# Patient Record
Sex: MALE | Race: WHITE | NOT HISPANIC OR LATINO | Employment: OTHER | URBAN - METROPOLITAN AREA
[De-identification: names, ages, dates, MRNs, and addresses within clinical notes are randomized per-mention and may not be internally consistent; named-entity substitution may affect disease eponyms.]

---

## 2017-08-16 DIAGNOSIS — E78.2 MIXED HYPERLIPIDEMIA: ICD-10-CM

## 2017-08-16 RX ORDER — ATORVASTATIN CALCIUM 10 MG/1
10 TABLET, FILM COATED ORAL EVERY EVENING
Qty: 30 TAB | Refills: 0 | Status: SHIPPED | OUTPATIENT
Start: 2017-08-16 | End: 2017-08-17 | Stop reason: SDUPTHER

## 2017-08-17 ENCOUNTER — OFFICE VISIT (OUTPATIENT)
Dept: MEDICAL GROUP | Facility: MEDICAL CENTER | Age: 57
End: 2017-08-17
Payer: MEDICARE

## 2017-08-17 VITALS
OXYGEN SATURATION: 96 % | DIASTOLIC BLOOD PRESSURE: 72 MMHG | BODY MASS INDEX: 27.43 KG/M2 | HEART RATE: 88 BPM | HEIGHT: 73 IN | WEIGHT: 207 LBS | TEMPERATURE: 97.2 F | SYSTOLIC BLOOD PRESSURE: 130 MMHG | RESPIRATION RATE: 16 BRPM

## 2017-08-17 DIAGNOSIS — Z12.5 SCREENING FOR PROSTATE CANCER: ICD-10-CM

## 2017-08-17 DIAGNOSIS — Z00.00 MEDICARE ANNUAL WELLNESS VISIT, SUBSEQUENT: ICD-10-CM

## 2017-08-17 DIAGNOSIS — R73.01 IMPAIRED FASTING GLUCOSE: ICD-10-CM

## 2017-08-17 DIAGNOSIS — E78.5 DYSLIPIDEMIA: ICD-10-CM

## 2017-08-17 DIAGNOSIS — S06.9X5S TRAUMATIC BRAIN INJURY WITH PROLONGED (MORE THAN 24 HOURS) LOSS OF CONSCIOUSNESS WITH RETURN TO PRE-EXISTING CONSCIOUS LEVEL, SEQUELA (HCC): ICD-10-CM

## 2017-08-17 PROBLEM — S06.9X5A TRAUMATIC BRAIN INJURY WITH PROLONGED (MORE THAN 24 HOURS) LOSS OF CONSCIOUSNESS WITH RETURN TO PRE-EXISTING CONSCIOUS LEVEL (HCC): Status: ACTIVE | Noted: 2017-08-17

## 2017-08-17 PROCEDURE — 99213 OFFICE O/P EST LOW 20 MIN: CPT | Mod: 25 | Performed by: NURSE PRACTITIONER

## 2017-08-17 PROCEDURE — G0439 PPPS, SUBSEQ VISIT: HCPCS | Mod: 25 | Performed by: NURSE PRACTITIONER

## 2017-08-17 RX ORDER — ATORVASTATIN CALCIUM 10 MG/1
10 TABLET, FILM COATED ORAL EVERY EVENING
Qty: 90 TAB | Refills: 1 | Status: SHIPPED | OUTPATIENT
Start: 2017-08-17 | End: 2018-03-15 | Stop reason: SDUPTHER

## 2017-08-17 ASSESSMENT — PATIENT HEALTH QUESTIONNAIRE - PHQ9: CLINICAL INTERPRETATION OF PHQ2 SCORE: 0

## 2017-08-17 NOTE — PROGRESS NOTES
CC: Patient is here today for annual Medicare wellness visit as well as lab work and to discuss his medication.    HPI:   Mac presents today with the following.    1. Medicare annual wellness visit, subsequent  Screening performed below.    2. Dyslipidemia  Patient's only prescription medicine has been Lipitor 10 mg which was prescribed by his previous PCP. His last blood work was done a year ago and showed an LDL of 75 and HDL of 33 on his medication. He denies myalgias but wonders if he should continue the medicine and why he should do so. He is also taking an aspirin daily.    3. Impaired fasting glucose  Patient's previous lab work showed mild elevations in blood sugar and he has not had a hemoglobin A1c. He does not complain of polyuria or polydipsia.    4. Screening for prostate cancer  Patient due for yearly screening.    5. 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 He does not seem to be worsening but it does frustrate patient that he has trouble remembering. He has seen neurology in the past and there is not much more that can be done except to try to keep his brain challenged which she is doing.      Depression Screening    Little interest or pleasure in doing things?  0 - not at all  Feeling down, depressed , or hopeless? 0 - not at all  Trouble falling or staying asleep, or sleeping too much?     Feeling tired or having little energy?     Poor appetite or overeating?     Feeling bad about yourself - or that you are a failure or have let yourself or your family down?    Trouble concentrating on things, such as reading the newspaper or watching television?    Moving or speaking so slowly that other people could have noticed.  Or the opposite - being so fidgety or restless that you have been  moving around a lot more than usual?     Thoughts that you would be better off dead, or of hurting yourself?     Patient Health Questionnaire Score:      If depressive symptoms identified deferred to follow up visit unless specifically addressed in assessment and plan.    Interpretation of PHQ-9 Total Score   Score Severity   1-4 No Depression   5-9 Mild Depression   10-14 Moderate Depression   15-19 Moderately Severe Depression   20-27 Severe Depression      Screening for Cognitive Impairment    Three Minute Recall (apple, watch, edwardo)  3/3    Draw clock face with all 12 numbers set to the hand to show 10 minutes past 11 o'clock  1    Cognitive concerns identified deferred for follow up unless specifically addressed in assessment and plan.    Fall Risk Assessment    Has the patient had two or more falls in the last year or any fall with injury in the last year?  No    Safety Assessment    Throw rugs on floor.  Yes  Handrails on all stairs.  Yes  Good lighting in all hallways.  Yes  Difficulty hearing.  No  Patient counseled about all safety risks that were identified.    Functional Assessment ADLs    Are there any barriers preventing you from cooking for yourself or meeting nutritional needs?  No.    Are there any barriers preventing you from driving safely or obtaining transportation?  No.    Are there any barriers preventing you from using a telephone or calling for help?  No.    Are there any barriers preventing you from shopping?  No.    Are there any barriers preventing you from taking care of your own finances?  No.    Are there any barriers preventing you from managing your medications?  No.    Are currently engaging any exercise or physical activity?  Yes.       Health Maintenance Summary                Annual Wellness Visit Overdue 5/12/2017      Done 5/11/2016      Patient has more history with this topic...    IMM DTaP/Tdap/Td Vaccine Postponed 8/15/2018 Originally 1/6/1978. Patient Refused    IMM  "INFLUENZA Next Due 9/1/2017     COLONOSCOPY Next Due 11/5/2025      Patient Declined 11/5/2015 Does not want one     Patient has more history with this topic...          Patient Care Team:  HUMA De. 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 90 Tab 1   • aspirin EC (ECOTRIN) 81 MG Tablet Delayed Response Take 81 mg by mouth every day.     • Multiple Vitamins-Minerals (CENTRUM SILVER ADULT 50+ PO) Take  by mouth.     • folic acid (FOLVITE) 1 MG Tab Take 1 mg by mouth every day.       No current facility-administered medications for this visit.         Allergies as of 08/17/2017   • (No Known Allergies)        ROS: As per HPI.    /72 mmHg  Pulse 88  Temp(Src) 36.2 °C (97.2 °F)  Resp 16  Ht 1.854 m (6' 1\")  Wt 93.895 kg (207 lb)  BMI 27.32 kg/m2  SpO2 96%    Physical Exam:  Gen:         Alert and oriented, No apparent distress.  Neck:        No Lymphadenopathy or Bruits.  Lungs:     Clear to auscultation bilaterally  CV:          Regular rate and rhythm. No murmurs, rubs or gallops.  Abd:         Soft non tender, non distended. Normal active bowel sounds.  No  Hepatosplenomegaly, No pulsatile masses.                   Ext:          No clubbing, cyanosis, edema.  Neuro:      Patient is pleasant and talkative but he does have some problems with short-term memory.    Assessment and Plan.   57 y.o. male with the following issues.    1. Medicare annual wellness visit, subsequent  Annual wellness topics discussed, review of chronic medical problems completed    - Annual Wellness Visit - Includes PPPS Subsequent ()    2. " Dyslipidemia  I reviewed with patient the pros and cons of Lipitor which does appear to be working for him. He agrees that he wishes to continue on the medicine and I reminded him he needs to have lab work yearly and follow up yearly for evaluation.  - atorvastatin (LIPITOR) 10 MG Tab; Take 1 Tab by mouth every evening.  Dispense: 90 Tab; Refill: 1  - LIPID PROFILE; Future    3. Impaired fasting glucose  Last blood sugar was 102 and previous blood sugars have been in the low 100 range fasting so I will have him do a hemoglobin A1c.  - COMP METABOLIC PANEL; Future  - HEMOGLOBIN A1C; Future    4. Screening for prostate cancer    - PROSTATE SPECIFIC AG SCREENING; Future    5. 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 He continues to try to stay active both mentally and physically.

## 2017-08-17 NOTE — MR AVS SNAPSHOT
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, sequela (CMS-[facility])   [7339263]         Vital Signs     Blood Pressure Pulse Temperature Respirations Height Weight    130/72 mmHg 88 36.2 °C (97.2 °F) 16 1.854 m (6' 1\") 93.895 kg (207 lb)    Body Mass Index Oxygen Saturation Smoking Status             27.32 kg/m2 96% Never Smoker          Basic Information     Date Of Birth Sex Race Ethnicity Preferred Language    1960 Male White Non- English      Problem List              ICD-10-CM Priority Class Noted - Resolved    Dyslipidemia E78.5   2016 - Present    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      Below and/or attached are the medications your provider expects you to take. Review all of your home medications and newly ordered medications with your provider " and/or pharmacist. Follow medication instructions as directed by your provider and/or pharmacist. Please keep your medication list with you and share with your provider. Update the information when medications are discontinued, doses are changed, or new medications (including over-the-counter products) are added; and carry medication information at all times in the event of emergency situations     Allergies:  No Known Allergies          Medications  Valid as of: August 17, 2017 -  3:28 PM    Generic Name Brand Name Tablet Size Instructions for use    Aspirin (Tablet Delayed Response) ECOTRIN 81 MG Take 81 mg by mouth every day.        Atorvastatin Calcium (Tab) LIPITOR 10 MG Take 1 Tab by mouth every evening.        Folic Acid (Tab) FOLVITE 1 MG Take 1 mg by mouth every day.        Multiple Vitamins-Minerals   Take  by mouth.        .                 Medicines prescribed today were sent to:     [facility] [address] [place] [address] (S), [place] - [address]    [facility] [place] ([place]) [place] [address]    Phone: [phone] Fax: [phone]    Open 24 Hours?: No      Medication refill instructions:       If your prescription bottle indicates you have medication refills left, it is not necessary to call your provider’s office. Please contact your pharmacy and they will refill your medication.    If your prescription bottle indicates you do not have any refills left, you may request refills at any time through one of the following ways: The online [employer] system (except Urgent Care), by calling your provider’s office, or by asking your pharmacy to contact your provider’s office with a refill request. Medication refills are processed only during regular business hours and may not be available until the next business day. Your provider may request additional information or to have a follow-up visit with you prior to refilling your medication.   *Please Note: Medication refills are assigned a new Rx number when refilled  electronically. Your pharmacy may indicate that no refills were authorized even though a new prescription for the same medication is available at the pharmacy. Please request the medicine by name with the pharmacy before contacting your provider for a refill.        Your To Do List     Future Labs/Procedures Complete By Expires    COMP METABOLIC PANEL  As directed 8/18/2018    HEMOGLOBIN A1C  As directed 8/18/2018    LIPID PROFILE  As directed 8/18/2018    PROSTATE SPECIFIC AG SCREENING  As directed 8/18/2018         MyChart Access Code: Activation code not generated  Current Dataium Status: Active

## 2017-08-22 ENCOUNTER — HOSPITAL ENCOUNTER (OUTPATIENT)
Dept: LAB | Facility: MEDICAL CENTER | Age: 57
End: 2017-08-22
Attending: NURSE PRACTITIONER
Payer: MEDICARE

## 2017-08-22 DIAGNOSIS — E78.5 DYSLIPIDEMIA: ICD-10-CM

## 2017-08-22 DIAGNOSIS — R73.01 IMPAIRED FASTING GLUCOSE: ICD-10-CM

## 2017-08-22 DIAGNOSIS — Z12.5 SCREENING FOR PROSTATE CANCER: ICD-10-CM

## 2017-08-22 LAB
ALBUMIN SERPL BCP-MCNC: 4.4 G/DL (ref 3.2–4.9)
ALBUMIN/GLOB SERPL: 1.3 G/DL
ALP SERPL-CCNC: 47 U/L (ref 30–99)
ALT SERPL-CCNC: 25 U/L (ref 2–50)
ANION GAP SERPL CALC-SCNC: 8 MMOL/L (ref 0–11.9)
AST SERPL-CCNC: 21 U/L (ref 12–45)
BILIRUB SERPL-MCNC: 0.6 MG/DL (ref 0.1–1.5)
BUN SERPL-MCNC: 17 MG/DL (ref 8–22)
CALCIUM SERPL-MCNC: 9.4 MG/DL (ref 8.5–10.5)
CHLORIDE SERPL-SCNC: 103 MMOL/L (ref 96–112)
CHOLEST SERPL-MCNC: 148 MG/DL (ref 100–199)
CO2 SERPL-SCNC: 27 MMOL/L (ref 20–33)
CREAT SERPL-MCNC: 0.83 MG/DL (ref 0.5–1.4)
EST. AVERAGE GLUCOSE BLD GHB EST-MCNC: 128 MG/DL
GFR SERPL CREATININE-BSD FRML MDRD: >60 ML/MIN/1.73 M 2
GLOBULIN SER CALC-MCNC: 3.3 G/DL (ref 1.9–3.5)
GLUCOSE SERPL-MCNC: 92 MG/DL (ref 65–99)
HBA1C MFR BLD: 6.1 % (ref 0–5.6)
HDLC SERPL-MCNC: 35 MG/DL
LDLC SERPL CALC-MCNC: 87 MG/DL
POTASSIUM SERPL-SCNC: 4.3 MMOL/L (ref 3.6–5.5)
PROT SERPL-MCNC: 7.7 G/DL (ref 6–8.2)
PSA SERPL-MCNC: 0.44 NG/ML (ref 0–4)
SODIUM SERPL-SCNC: 138 MMOL/L (ref 135–145)
TRIGL SERPL-MCNC: 129 MG/DL (ref 0–149)

## 2017-08-22 PROCEDURE — 83036 HEMOGLOBIN GLYCOSYLATED A1C: CPT | Mod: GA

## 2017-08-22 PROCEDURE — 80061 LIPID PANEL: CPT

## 2017-08-22 PROCEDURE — 84153 ASSAY OF PSA TOTAL: CPT | Mod: GA

## 2017-08-22 PROCEDURE — 36415 COLL VENOUS BLD VENIPUNCTURE: CPT

## 2017-08-22 PROCEDURE — 80053 COMPREHEN METABOLIC PANEL: CPT

## 2017-11-10 ENCOUNTER — TELEPHONE (OUTPATIENT)
Dept: MEDICAL GROUP | Facility: MEDICAL CENTER | Age: 57
End: 2017-11-10

## 2017-11-10 NOTE — TELEPHONE ENCOUNTER
No labs needed but he does need a visit if he wishes me to fill out his disability form in the next week or so.

## 2017-11-10 NOTE — TELEPHONE ENCOUNTER
1. Caller Name: katya                      Call Back Number: [phone] (home)       2. Message: patient would like to get his labs ordered for his next appt.     3. Patient approves office to leave a detailed voicemail/MyChart message: N\A

## 2017-11-15 ENCOUNTER — OFFICE VISIT (OUTPATIENT)
Dept: MEDICAL GROUP | Facility: MEDICAL CENTER | Age: 57
End: 2017-11-15
Payer: MEDICARE

## 2017-11-15 VITALS
HEART RATE: 78 BPM | HEIGHT: 73 IN | WEIGHT: 206 LBS | OXYGEN SATURATION: 93 % | BODY MASS INDEX: 27.3 KG/M2 | DIASTOLIC BLOOD PRESSURE: 74 MMHG | SYSTOLIC BLOOD PRESSURE: 130 MMHG | RESPIRATION RATE: 16 BRPM

## 2017-11-15 DIAGNOSIS — E78.5 DYSLIPIDEMIA: ICD-10-CM

## 2017-11-15 DIAGNOSIS — S06.9X5S TRAUMATIC BRAIN INJURY WITH PROLONGED (MORE THAN 24 HOURS) LOSS OF CONSCIOUSNESS WITH RETURN TO PRE-EXISTING CONSCIOUS LEVEL, SEQUELA (HCC): ICD-10-CM

## 2017-11-15 DIAGNOSIS — R73.01 IMPAIRED FASTING GLUCOSE: ICD-10-CM

## 2017-11-15 PROCEDURE — 99213 OFFICE O/P EST LOW 20 MIN: CPT | Performed by: NURSE PRACTITIONER

## 2017-11-15 ASSESSMENT — ENCOUNTER SYMPTOMS: MEMORY LOSS: 1

## 2017-11-15 NOTE — PROGRESS NOTES
Subjective:      Mac Yoder is a 57 y.o. male who presents with Other (fill ou disability paperwork)            HPI Mac Yoder Is here today accompanied by his girlfriend for paperwork for disability as well as follow-up on lab results.      1. 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 He is a prior patient of  who would fill out his paperwork yearly from review of chart. Patient's main problem is short-term memory. He will often forget what he did just a half hour prior to being asked. He has difficulty making decisions which his girlfriend helps him with.    2. Dyslipidemia  Patient is currently on low-dose Lipitor and his last cholesterol levels were good except for slightly low HDL.    3. Impaired fasting glucose  It was noted on his initial visit with myself that his blood sugars were running slightly high and therefore a hemoglobin A1c was done and the results came back at 6.1. He denies diabetes symptoms.  Social History   Substance Use Topics   • Smoking status: Never Smoker   • Smokeless tobacco: Never Used   • Alcohol use 0.6 oz/week     1 Cans of beer per week     Current Outpatient Prescriptions   Medication Sig Dispense Refill   • atorvastatin (LIPITOR) 10 MG Tab Take 1 Tab by mouth every evening. 90 Tab 1   • aspirin EC (ECOTRIN) 81 MG Tablet Delayed Response Take 81 mg by mouth every day.     • Multiple Vitamins-Minerals (CENTRUM SILVER ADULT 50+ PO) Take  by mouth.     • folic acid (FOLVITE) 1 MG Tab Take 1 mg by mouth every day.       No current facility-administered medications for this visit.      Past Medical History:   Diagnosis Date   • Hyperlipidemia      Family History   Problem Relation Age of Onset   • Other Mother    • Other Father      Cholesterol       Review of Systems  "  Psychiatric/Behavioral: Positive for memory loss.   All other systems reviewed and are negative.         Objective:     /74   Pulse 78   Resp 16   Ht 1.854 m (6' 1\")   Wt 93.4 kg (206 lb)   SpO2 93%   BMI 27.18 kg/m²      Physical Exam   Constitutional: He is oriented to person, place, and time. He appears well-developed and well-nourished. No distress.   HENT:   Head: Normocephalic and atraumatic.   Right Ear: External ear normal.   Left Ear: External ear normal.   Nose: Nose normal.   Mouth/Throat: Oropharynx is clear and moist.   Eyes: Conjunctivae are normal. Right eye exhibits no discharge. Left eye exhibits no discharge.   Neck: Normal range of motion. Neck supple. No tracheal deviation present. No thyromegaly present.   Cardiovascular: Normal rate, regular rhythm and normal heart sounds.    No murmur heard.  Pulmonary/Chest: Effort normal and breath sounds normal. No respiratory distress. He has no wheezes. He has no rales.   Lymphadenopathy:     He has no cervical adenopathy.   Neurological: He is alert and oriented to person, place, and time. Coordination normal.   Patient is pleasant, alert and talkative. He has difficulty with giving me the day of the week and today's date. He has difficulty with questions related to his events of the day. He is orientated to current president and birth date.   Skin: Skin is warm and dry. No rash noted. He is not diaphoretic. No erythema.   Psychiatric: He has a normal mood and affect. His behavior is normal. Judgment and thought content normal.   Nursing note and vitals reviewed.              Assessment/Plan:     1. 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    2. Dyslipidemia  Patient reminded to take his statin and get lab work done yearly. I recommended more exercise to " improve his HDL.    3. Impaired fasting glucose  I reviewed with patient that he is prediabetic and he needs to watch his carbohydrates. I would like him to do lab work in February and then follow-up to be sure he does not need to start on metformin.  - COMP METABOLIC PANEL; Future  - HEMOGLOBIN A1C; Future

## 2017-12-14 ENCOUNTER — PATIENT OUTREACH (OUTPATIENT)
Dept: HEALTH INFORMATION MANAGEMENT | Facility: OTHER | Age: 57
End: 2017-12-14

## 2017-12-14 NOTE — PROGRESS NOTES
1. Attempt # Final    2. HealthConnect Verified: no    3. Verify PCP: yes    4. Care Team Updated:       •   DME Company (gait device, O2, CPAP, etc.): NO       •   Other Specialists (eye doctor, derm, GYN, cardiology, endo, etc): YES// Pt has more specialist that she sees in the regular basis, but doesn't remember their names, will bring info to her next appt.    5.  Reviewed/Updated the following with patient:       •   Communication Preference Obtained? YES       •   Preferred Pharmacy? YES       •   Preferred Lab? YES       •   Family History (document living status of immediate family members and if + hx of cancer, diabetes, hypertension, hyperlipidemia, heart attack, stroke) YES. Was Abstract Encounter opened and chart updated? YES    6. [employer] Activation: already active    7. [employer] Rashel: yes    8. Annual Wellness Visit Scheduling  Scheduling Status:Scheduled      9. Care Gap Scheduling (Attempt to Schedule EACH Overdue Care Gap!)     Health Maintenance Due   Topic Date Due   • IMM INFLUENZA (1) 09/01/2017        Scheduled patient for Annual Wellness Visit      10. Patient was advised: “This is a free wellness visit. The provider will screen for medical conditions to help you stay healthy. If you have other concerns to address you may be asked to discuss these at a separate visit or there may be an additional fee.”     11. Patient was informed to arrive 15 min prior to their scheduled appointment and bring in their medication bottles.

## 2018-02-16 ENCOUNTER — HOSPITAL ENCOUNTER (OUTPATIENT)
Dept: LAB | Facility: MEDICAL CENTER | Age: 58
End: 2018-02-16
Attending: NURSE PRACTITIONER
Payer: MEDICARE

## 2018-02-16 DIAGNOSIS — R73.01 IMPAIRED FASTING GLUCOSE: ICD-10-CM

## 2018-02-16 LAB
ALBUMIN SERPL BCP-MCNC: 4.7 G/DL (ref 3.2–4.9)
ALBUMIN/GLOB SERPL: 1.5 G/DL
ALP SERPL-CCNC: 43 U/L (ref 30–99)
ALT SERPL-CCNC: 39 U/L (ref 2–50)
ANION GAP SERPL CALC-SCNC: 9 MMOL/L (ref 0–11.9)
AST SERPL-CCNC: 27 U/L (ref 12–45)
BILIRUB SERPL-MCNC: 0.6 MG/DL (ref 0.1–1.5)
BUN SERPL-MCNC: 16 MG/DL (ref 8–22)
CALCIUM SERPL-MCNC: 9.5 MG/DL (ref 8.5–10.5)
CHLORIDE SERPL-SCNC: 99 MMOL/L (ref 96–112)
CO2 SERPL-SCNC: 27 MMOL/L (ref 20–33)
CREAT SERPL-MCNC: 0.71 MG/DL (ref 0.5–1.4)
EST. AVERAGE GLUCOSE BLD GHB EST-MCNC: 134 MG/DL
GLOBULIN SER CALC-MCNC: 3.1 G/DL (ref 1.9–3.5)
GLUCOSE SERPL-MCNC: 102 MG/DL (ref 65–99)
HBA1C MFR BLD: 6.3 % (ref 0–5.6)
POTASSIUM SERPL-SCNC: 4 MMOL/L (ref 3.6–5.5)
PROT SERPL-MCNC: 7.8 G/DL (ref 6–8.2)
SODIUM SERPL-SCNC: 135 MMOL/L (ref 135–145)

## 2018-02-16 PROCEDURE — 80053 COMPREHEN METABOLIC PANEL: CPT

## 2018-02-16 PROCEDURE — 36415 COLL VENOUS BLD VENIPUNCTURE: CPT

## 2018-02-16 PROCEDURE — 83036 HEMOGLOBIN GLYCOSYLATED A1C: CPT | Mod: GA

## 2018-02-26 ENCOUNTER — TELEPHONE (OUTPATIENT)
Dept: MEDICAL GROUP | Facility: MEDICAL CENTER | Age: 58
End: 2018-02-26

## 2018-02-26 ENCOUNTER — OFFICE VISIT (OUTPATIENT)
Dept: MEDICAL GROUP | Facility: MEDICAL CENTER | Age: 58
End: 2018-02-26
Payer: MEDICARE

## 2018-02-26 VITALS
RESPIRATION RATE: 16 BRPM | OXYGEN SATURATION: 99 % | TEMPERATURE: 97.7 F | BODY MASS INDEX: 28.23 KG/M2 | SYSTOLIC BLOOD PRESSURE: 134 MMHG | HEIGHT: 73 IN | WEIGHT: 213 LBS | HEART RATE: 84 BPM | DIASTOLIC BLOOD PRESSURE: 76 MMHG

## 2018-02-26 DIAGNOSIS — R73.01 IMPAIRED FASTING GLUCOSE: ICD-10-CM

## 2018-02-26 DIAGNOSIS — E78.5 DYSLIPIDEMIA: ICD-10-CM

## 2018-02-26 DIAGNOSIS — Z12.5 SCREENING FOR PROSTATE CANCER: ICD-10-CM

## 2018-02-26 PROCEDURE — 99213 OFFICE O/P EST LOW 20 MIN: CPT | Performed by: NURSE PRACTITIONER

## 2018-02-26 ASSESSMENT — ENCOUNTER SYMPTOMS: MEMORY LOSS: 1

## 2018-02-26 NOTE — TELEPHONE ENCOUNTER
1. Caller Name: Mac Yoder                                         Call Back Number: [phone] (home)       Patient approves a detailed voicemail message: N\A    Pt changed his mind about pre diabetics medication. Can you prescribe that medication?

## 2018-02-26 NOTE — PROGRESS NOTES
"Subjective:      Mac Yoder is a 58 y.o. male who presents with Follow-Up (lab results)        CC: Patient here today accompanied by his significant other to follow-up on lab results.    HPI Mac Yoder      1. Impaired fasting glucose  On patient's initial visit last year it was noted that his blood sugars were slightly high and hemoglobin A1c came back prediabetes at 6.1. Today patient's lab results come back slightly higher at 6.3. His girlfriend who cooks for him admits he eats a very high carbohydrate diet. Reports he feels generally well except for his memory issues which are unchanged. He denies polyuria or polydipsia.    2. Dyslipidemia  Patient continues on low-dose Lipitor and chemistry panel is normal except for blood sugar of 102.    3. Screening for prostate cancer  Patient will be due for PSA screening in September.  Social History   Substance Use Topics   • Smoking status: Never Smoker   • Smokeless tobacco: Never Used   • Alcohol use 0.6 oz/week     1 Cans of beer per week     Current Outpatient Prescriptions   Medication Sig Dispense Refill   • atorvastatin (LIPITOR) 10 MG Tab Take 1 Tab by mouth every evening. 90 Tab 1   • aspirin EC (ECOTRIN) 81 MG Tablet Delayed Response Take 81 mg by mouth every day.     • Multiple Vitamins-Minerals (CENTRUM SILVER ADULT 50+ PO) Take  by mouth.     • folic acid (FOLVITE) 1 MG Tab Take 1 mg by mouth every day.       No current facility-administered medications for this visit.      Past Medical History:   Diagnosis Date   • Hyperlipidemia      Family History   Problem Relation Age of Onset   • Other Mother    • Other Father      Cholesterol       Review of Systems   Psychiatric/Behavioral: Positive for memory loss.   All other systems reviewed and are negative.         Objective:     /76   Pulse 84   Temp 36.5 °C (97.7 °F)   Resp 16   Ht 1.854 m (6' 1\")   Wt 96.6 kg (213 lb)   SpO2 99%   BMI 28.10 kg/m²      Physical Exam   Constitutional: He " is oriented to person, place, and time. He appears well-developed and well-nourished. No distress.   HENT:   Head: Normocephalic and atraumatic.   Right Ear: External ear normal.   Left Ear: External ear normal.   Nose: Nose normal.   Mouth/Throat: Oropharynx is clear and moist.   Eyes: Conjunctivae are normal. Right eye exhibits no discharge. Left eye exhibits no discharge.   Neck: Normal range of motion. Neck supple. No tracheal deviation present. No thyromegaly present.   Cardiovascular: Normal rate, regular rhythm and normal heart sounds.    No murmur heard.  Pulmonary/Chest: Effort normal and breath sounds normal. No respiratory distress. He has no wheezes. He has no rales.   Lymphadenopathy:     He has no cervical adenopathy.   Neurological: He is alert and oriented to person, place, and time. Coordination normal.   Skin: Skin is warm and dry. No rash noted. He is not diaphoretic. No erythema.   Psychiatric: He has a normal mood and affect. His behavior is normal. Judgment and thought content normal.   Patient is pleasant and talkative although his girlfriend has to provide much of the information today because of his memory issues.   Nursing note and vitals reviewed.              Assessment/Plan:     1. Impaired fasting glucose  I reviewed the risks with prolonged elevated hemoglobin A1c with patient and his significant other. I offered him the option of starting on low-dose metformin and the potential side effects with the medication. He and his significant other have decided to try a low carb diet and then recheck levels in 6 months and if not improved or worsened, he will consider medication. I reviewed with patient a low carb diet in the office today.  - COMP METABOLIC PANEL; Future  - HEMOGLOBIN A1C; Future  - MICROALBUMIN CREAT RATIO URINE; Future    2. Dyslipidemia  Patient will do lab work before his next visit and reports no side effects on Lipitor.  - LIPID PROFILE; Future    3. Screening for  prostate cancer    - PROSTATE SPECIFIC AG SCREENING; Future

## 2018-03-15 DIAGNOSIS — E78.5 DYSLIPIDEMIA: ICD-10-CM

## 2018-03-15 RX ORDER — ATORVASTATIN CALCIUM 10 MG/1
10 TABLET, FILM COATED ORAL EVERY EVENING
Qty: 90 TAB | Refills: 1 | Status: SHIPPED | OUTPATIENT
Start: 2018-03-15 | End: 2018-09-10 | Stop reason: SDUPTHER

## 2018-05-11 ENCOUNTER — OFFICE VISIT (OUTPATIENT)
Dept: MEDICAL GROUP | Facility: MEDICAL CENTER | Age: 58
End: 2018-05-11
Payer: MEDICARE

## 2018-05-11 VITALS
WEIGHT: 208 LBS | SYSTOLIC BLOOD PRESSURE: 132 MMHG | TEMPERATURE: 98.7 F | BODY MASS INDEX: 27.57 KG/M2 | HEART RATE: 90 BPM | HEIGHT: 73 IN | DIASTOLIC BLOOD PRESSURE: 74 MMHG | OXYGEN SATURATION: 94 % | RESPIRATION RATE: 16 BRPM

## 2018-05-11 DIAGNOSIS — R73.01 IMPAIRED FASTING GLUCOSE: ICD-10-CM

## 2018-05-11 DIAGNOSIS — S06.9X5S TRAUMATIC BRAIN INJURY WITH PROLONGED (MORE THAN 24 HOURS) LOSS OF CONSCIOUSNESS WITH RETURN TO PRE-EXISTING CONSCIOUS LEVEL, SEQUELA (HCC): ICD-10-CM

## 2018-05-11 PROCEDURE — 99213 OFFICE O/P EST LOW 20 MIN: CPT | Performed by: NURSE PRACTITIONER

## 2018-05-11 ASSESSMENT — ENCOUNTER SYMPTOMS: MEMORY LOSS: 1

## 2018-05-11 NOTE — PROGRESS NOTES
"Subjective:      Mac Yoder is a 58 y.o. male who presents with Follow-Up (fill out paperwork Disability)        CC: Patient is here today mainly for disability paperwork for traumatic brain injury.    HPI Mac Yoder has history of traumatic brain injury for which she was going to  up until last year. Patient has difficulty with short-term memory and his girlfriend who is with him has to help him with many mental tasks. He has no physical disabilities. He is pleasant but almost childlike in his behavior today as with previous visits. He continues on his metformin for his well controlled diabetes.      Current Outpatient Prescriptions   Medication Sig Dispense Refill   • atorvastatin (LIPITOR) 10 MG Tab Take 1 Tab by mouth every evening. 90 Tab 1   • metFORMIN (GLUCOPHAGE) 500 MG Tab Take 1 Tab by mouth every day. 30 Tab 5   • aspirin EC (ECOTRIN) 81 MG Tablet Delayed Response Take 81 mg by mouth every day.     • Multiple Vitamins-Minerals (CENTRUM SILVER ADULT 50+ PO) Take  by mouth.     • folic acid (FOLVITE) 1 MG Tab Take 1 mg by mouth every day.       No current facility-administered medications for this visit.      Social History   Substance Use Topics   • Smoking status: Never Smoker   • Smokeless tobacco: Never Used   • Alcohol use 0.6 oz/week     1 Cans of beer per week     Family History   Problem Relation Age of Onset   • Other Mother    • Other Father      Cholesterol     Past Medical History:   Diagnosis Date   • Hyperlipidemia        Review of Systems   Psychiatric/Behavioral: Positive for memory loss.   All other systems reviewed and are negative.         Objective:     /74   Pulse 90   Temp 37.1 °C (98.7 °F)   Resp 16   Ht 1.854 m (6' 1\")   Wt 94.3 kg (208 lb)   SpO2 94%   BMI 27.44 kg/m²      Physical Exam   Constitutional: He is oriented to person, place, and time. He appears well-developed and well-nourished. No distress.   HENT:   Head: Normocephalic and atraumatic. "   Right Ear: External ear normal.   Left Ear: External ear normal.   Nose: Nose normal.   Mouth/Throat: Oropharynx is clear and moist.   Eyes: Conjunctivae are normal. Right eye exhibits no discharge. Left eye exhibits no discharge.   Neck: Normal range of motion. Neck supple. No tracheal deviation present. No thyromegaly present.   Cardiovascular: Normal rate, regular rhythm and normal heart sounds.    No murmur heard.  Pulmonary/Chest: Effort normal and breath sounds normal. No respiratory distress. He has no wheezes. He has no rales.   Lymphadenopathy:     He has no cervical adenopathy.   Neurological: He is alert and oriented to person, place, and time. Coordination normal.   Skin: Skin is warm and dry. No rash noted. He is not diaphoretic. No erythema.   Psychiatric: He has a normal mood and affect. His behavior is normal. Judgment and thought content normal.   Patient almost childlike and his behavior and has difficulty with remembering things he did recently. His girlfriend answers many of his questions for him today in the office.   Nursing note and vitals reviewed.              Assessment/Plan:     1. 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 He has been seen by neurology in the past and there was not much more that can be done for his inability to do things which require alertness or remembering.    2. Impaired fasting glucose  Patient has lab work to do in the next few months and then we'll be following back to discuss his metformin and other medications.

## 2018-06-13 ENCOUNTER — OFFICE VISIT (OUTPATIENT)
Dept: MEDICAL GROUP | Facility: MEDICAL CENTER | Age: 58
End: 2018-06-13
Payer: MEDICARE

## 2018-06-13 ENCOUNTER — HOSPITAL ENCOUNTER (OUTPATIENT)
Dept: RADIOLOGY | Facility: MEDICAL CENTER | Age: 58
End: 2018-06-13
Attending: NURSE PRACTITIONER
Payer: MEDICARE

## 2018-06-13 VITALS
BODY MASS INDEX: 27.43 KG/M2 | WEIGHT: 207 LBS | OXYGEN SATURATION: 97 % | DIASTOLIC BLOOD PRESSURE: 70 MMHG | HEART RATE: 86 BPM | RESPIRATION RATE: 16 BRPM | SYSTOLIC BLOOD PRESSURE: 110 MMHG | HEIGHT: 73 IN

## 2018-06-13 DIAGNOSIS — M25.511 ACUTE PAIN OF RIGHT SHOULDER: ICD-10-CM

## 2018-06-13 DIAGNOSIS — R73.01 IMPAIRED FASTING GLUCOSE: ICD-10-CM

## 2018-06-13 PROCEDURE — 73030 X-RAY EXAM OF SHOULDER: CPT | Mod: RT

## 2018-06-13 PROCEDURE — 99214 OFFICE O/P EST MOD 30 MIN: CPT | Performed by: NURSE PRACTITIONER

## 2018-06-13 NOTE — PROGRESS NOTES
Subjective:      Mac Yoder is a 58 y.o. male who presents with Shoulder Pain (right one)        CC: Patient here today for right shoulder pain.    HPI Mac Yoder      1. Acute pain of right shoulder  Patient reports that he has had some right shoulder pain on and off for years but approximately a month ago it became more severe. There was no precipitating event or injury. It is mostly on the anterior shoulder into the bicep. It only hurts with elevation of his right arm or when laying on it at night. Sometimes it makes it difficult for him to sleep at night. He states he did go to orthopedics and was seen twice for this. He states x-rays were not done and he was placed on Naprosyn which does not help fully. He would now like to look into other treatment options such as steroid injections. He denies neck pain or radiation of pain into the lower arm. He denies paresthesias.    2. Impaired fasting glucose  Patient still pending lab work with previous hemoglobin A1c of 6.3. He continues on his low-dose metformin once a day.  Current Outpatient Prescriptions   Medication Sig Dispense Refill   • diclofenac (SOLARAZE) 3 % gel BID to shoulder 50 g 11   • atorvastatin (LIPITOR) 10 MG Tab Take 1 Tab by mouth every evening. 90 Tab 1   • metFORMIN (GLUCOPHAGE) 500 MG Tab Take 1 Tab by mouth every day. 30 Tab 5   • aspirin EC (ECOTRIN) 81 MG Tablet Delayed Response Take 81 mg by mouth every day.     • Multiple Vitamins-Minerals (CENTRUM SILVER ADULT 50+ PO) Take  by mouth.     • folic acid (FOLVITE) 1 MG Tab Take 1 mg by mouth every day.       No current facility-administered medications for this visit.      Social History   Substance Use Topics   • Smoking status: Never Smoker   • Smokeless tobacco: Never Used   • Alcohol use 0.6 oz/week     1 Cans of beer per week     Past Medical History:   Diagnosis Date   • Hyperlipidemia      Family History   Problem Relation Age of Onset   • Other Mother    • Other Father       "Cholesterol       Review of Systems   Musculoskeletal: Positive for joint pain.   All other systems reviewed and are negative.         Objective:     /70   Pulse 86   Resp 16   Ht 1.854 m (6' 1\")   Wt 93.9 kg (207 lb)   SpO2 97%   BMI 27.31 kg/m²      Physical Exam   Constitutional: He is oriented to person, place, and time. He appears well-developed and well-nourished. No distress.   HENT:   Head: Normocephalic and atraumatic.   Right Ear: External ear normal.   Left Ear: External ear normal.   Nose: Nose normal.   Mouth/Throat: Oropharynx is clear and moist.   Eyes: Conjunctivae are normal. Right eye exhibits no discharge. Left eye exhibits no discharge.   Neck: Normal range of motion. Neck supple. No tracheal deviation present. No thyromegaly present.   Cardiovascular: Normal rate, regular rhythm and normal heart sounds.    No murmur heard.  Pulmonary/Chest: Effort normal and breath sounds normal. No respiratory distress. He has no wheezes. He has no rales.   Musculoskeletal:   Patient points to his anterior right shoulder into the medial bicep area as to where the pain occurs. He is able to fully abduct and adduct his right arm although he reports pain above 50°. Hand  is strong. No tenderness to palpation.   Lymphadenopathy:     He has no cervical adenopathy.   Neurological: He is alert and oriented to person, place, and time. Coordination normal.   Skin: Skin is warm and dry. No rash noted. He is not diaphoretic. No erythema.   Psychiatric: He has a normal mood and affect. His behavior is normal. Judgment and thought content normal.   Patient very friendly but repeats himself frequently and his girlfriend provides much information today.   Nursing note and vitals reviewed.              Assessment/Plan:     1. Acute pain of right shoulder  I strongly advised physical therapy although he is more interested in looking into something such as a steroid injection. He finally agreed to start with " physical therapy and then follow up with sports medicine if his problem does not resolve. I advised him not to use the Naprosyn on a regular basis to prevent GI or kidney issues. He may use Tylenol instead. I did give him a prescription for diclofenac gel but explained it may not be covered by his insurance. He will get an x-ray of the shoulder.  - diclofenac (SOLARAZE) 3 % gel; BID to shoulder  Dispense: 50 g; Refill: 11  - REFERRAL TO SPORTS MEDICINE  - REFERRAL TO PHYSICAL THERAPY Reason for Therapy: Eval/Treat/Report  - DX-SHOULDER 2+ RIGHT; Future    2. Impaired fasting glucose  Patient reminded on a low-carb diet and to continue with his metformin and then do lab work.

## 2018-06-18 DIAGNOSIS — R73.01 IMPAIRED FASTING GLUCOSE: ICD-10-CM

## 2018-06-19 ENCOUNTER — TELEPHONE (OUTPATIENT)
Dept: MEDICAL GROUP | Facility: MEDICAL CENTER | Age: 58
End: 2018-06-19

## 2018-06-19 NOTE — TELEPHONE ENCOUNTER
Insurance denied the diclofenac gel, however the diclofenac potassium pill 50 MG is, please change the RX

## 2018-06-20 NOTE — TELEPHONE ENCOUNTER
Would not recommend the pills.  An alternative to the gell would be something like over the counter salonpas or aspercream.

## 2018-08-27 ENCOUNTER — HOSPITAL ENCOUNTER (OUTPATIENT)
Dept: LAB | Facility: MEDICAL CENTER | Age: 58
End: 2018-08-27
Attending: NURSE PRACTITIONER
Payer: MEDICARE

## 2018-08-27 DIAGNOSIS — R73.01 IMPAIRED FASTING GLUCOSE: ICD-10-CM

## 2018-08-27 DIAGNOSIS — E78.5 DYSLIPIDEMIA: ICD-10-CM

## 2018-08-27 DIAGNOSIS — Z12.5 SCREENING FOR PROSTATE CANCER: ICD-10-CM

## 2018-08-27 LAB
ALBUMIN SERPL BCP-MCNC: 4.7 G/DL (ref 3.2–4.9)
ALBUMIN/GLOB SERPL: 1.6 G/DL
ALP SERPL-CCNC: 44 U/L (ref 30–99)
ALT SERPL-CCNC: 25 U/L (ref 2–50)
ANION GAP SERPL CALC-SCNC: 7 MMOL/L (ref 0–11.9)
AST SERPL-CCNC: 19 U/L (ref 12–45)
BILIRUB SERPL-MCNC: 0.8 MG/DL (ref 0.1–1.5)
BUN SERPL-MCNC: 17 MG/DL (ref 8–22)
CALCIUM SERPL-MCNC: 9.5 MG/DL (ref 8.5–10.5)
CHLORIDE SERPL-SCNC: 102 MMOL/L (ref 96–112)
CHOLEST SERPL-MCNC: 154 MG/DL (ref 100–199)
CO2 SERPL-SCNC: 28 MMOL/L (ref 20–33)
CREAT SERPL-MCNC: 1 MG/DL (ref 0.5–1.4)
CREAT UR-MCNC: 89.4 MG/DL
EST. AVERAGE GLUCOSE BLD GHB EST-MCNC: 128 MG/DL
GLOBULIN SER CALC-MCNC: 3 G/DL (ref 1.9–3.5)
GLUCOSE SERPL-MCNC: 104 MG/DL (ref 65–99)
HBA1C MFR BLD: 6.1 % (ref 0–5.6)
HDLC SERPL-MCNC: 37 MG/DL
LDLC SERPL CALC-MCNC: 82 MG/DL
MICROALBUMIN UR-MCNC: <0.7 MG/DL
MICROALBUMIN/CREAT UR: NORMAL MG/G (ref 0–30)
POTASSIUM SERPL-SCNC: 4.3 MMOL/L (ref 3.6–5.5)
PROT SERPL-MCNC: 7.7 G/DL (ref 6–8.2)
PSA SERPL-MCNC: 0.39 NG/ML (ref 0–4)
SODIUM SERPL-SCNC: 137 MMOL/L (ref 135–145)
TRIGL SERPL-MCNC: 177 MG/DL (ref 0–149)

## 2018-08-27 PROCEDURE — 36415 COLL VENOUS BLD VENIPUNCTURE: CPT

## 2018-08-27 PROCEDURE — 84153 ASSAY OF PSA TOTAL: CPT

## 2018-08-27 PROCEDURE — 82043 UR ALBUMIN QUANTITATIVE: CPT

## 2018-08-27 PROCEDURE — 83036 HEMOGLOBIN GLYCOSYLATED A1C: CPT

## 2018-08-27 PROCEDURE — 80061 LIPID PANEL: CPT

## 2018-08-27 PROCEDURE — 82570 ASSAY OF URINE CREATININE: CPT

## 2018-08-27 PROCEDURE — 80053 COMPREHEN METABOLIC PANEL: CPT

## 2018-09-04 ENCOUNTER — OFFICE VISIT (OUTPATIENT)
Dept: MEDICAL GROUP | Facility: MEDICAL CENTER | Age: 58
End: 2018-09-04
Payer: MEDICARE

## 2018-09-04 VITALS
BODY MASS INDEX: 27.04 KG/M2 | DIASTOLIC BLOOD PRESSURE: 72 MMHG | RESPIRATION RATE: 16 BRPM | TEMPERATURE: 97.6 F | SYSTOLIC BLOOD PRESSURE: 116 MMHG | OXYGEN SATURATION: 93 % | HEART RATE: 88 BPM | HEIGHT: 73 IN | WEIGHT: 204 LBS

## 2018-09-04 DIAGNOSIS — E11.9 CONTROLLED TYPE 2 DIABETES MELLITUS WITHOUT COMPLICATION, WITHOUT LONG-TERM CURRENT USE OF INSULIN (HCC): ICD-10-CM

## 2018-09-04 DIAGNOSIS — E78.5 DYSLIPIDEMIA: ICD-10-CM

## 2018-09-04 PROBLEM — R73.01 IMPAIRED FASTING GLUCOSE: Status: RESOLVED | Noted: 2018-02-26 | Resolved: 2018-09-04

## 2018-09-04 PROCEDURE — 99213 OFFICE O/P EST LOW 20 MIN: CPT | Performed by: NURSE PRACTITIONER

## 2018-09-04 ASSESSMENT — PATIENT HEALTH QUESTIONNAIRE - PHQ9: CLINICAL INTERPRETATION OF PHQ2 SCORE: 0

## 2018-09-04 NOTE — PROGRESS NOTES
"Subjective:      Mac Yoder is a 58 y.o. male who presents with Follow-Up (6 months) and Results (labs)        CC: Patient here today for follow-up on diabetes and dyslipidemia.    HPI Mac Yoder      1. Controlled type 2 diabetes mellitus without complication, without long-term current use of insulin (HCC)  Patient continues with metformin 500 mg once a day and tries to watch the carbohydrates in his diet although he is not always successful. His hemoglobin A1c shows improvement today at 6.1 on medication. He denies neuropathy. He showed no microalbuminuria and liver and kidney functions are good.    2. Dyslipidemia  Patient is on low-dose Lipitor 10 mg as well as a baby aspirin daily. His LDL comes back at 82 on medication with slightly low HDL. He reports no myalgias on the medicines.  Social History   Substance Use Topics   • Smoking status: Never Smoker   • Smokeless tobacco: Never Used   • Alcohol use 0.6 oz/week     1 Cans of beer per week     Current Outpatient Prescriptions   Medication Sig Dispense Refill   • metFORMIN (GLUCOPHAGE) 500 MG Tab TAKE 1 TABLET BY MOUTH ONCE DAILY WITH  DINNER 90 Tab 3   • diclofenac (SOLARAZE) 3 % gel BID to shoulder 50 g 11   • atorvastatin (LIPITOR) 10 MG Tab Take 1 Tab by mouth every evening. 90 Tab 1   • aspirin EC (ECOTRIN) 81 MG Tablet Delayed Response Take 81 mg by mouth every day.     • Multiple Vitamins-Minerals (CENTRUM SILVER ADULT 50+ PO) Take  by mouth.     • folic acid (FOLVITE) 1 MG Tab Take 1 mg by mouth every day.       No current facility-administered medications for this visit.      Family History   Problem Relation Age of Onset   • Other Mother    • Other Father         Cholesterol     Past Medical History:   Diagnosis Date   • Hyperlipidemia        Review of Systems   All other systems reviewed and are negative.         Objective:     /72   Pulse 88   Temp 36.4 °C (97.6 °F)   Resp 16   Ht 1.854 m (6' 1\")   Wt 92.5 kg (204 lb)   SpO2 93% "   BMI 26.91 kg/m²      Physical Exam   Constitutional: He is oriented to person, place, and time. He appears well-developed and well-nourished. No distress.   HENT:   Head: Normocephalic and atraumatic.   Right Ear: External ear normal.   Left Ear: External ear normal.   Nose: Nose normal.   Mouth/Throat: Oropharynx is clear and moist.   Eyes: Conjunctivae are normal. Right eye exhibits no discharge. Left eye exhibits no discharge.   Neck: Normal range of motion. Neck supple. No tracheal deviation present. No thyromegaly present.   Cardiovascular: Normal rate, regular rhythm and normal heart sounds.    No murmur heard.  Pulmonary/Chest: Effort normal and breath sounds normal. No respiratory distress. He has no wheezes. He has no rales.   Lymphadenopathy:     He has no cervical adenopathy.   Neurological: He is alert and oriented to person, place, and time. Coordination normal.   Skin: Skin is warm and dry. No rash noted. He is not diaphoretic. No erythema.   Psychiatric: He has a normal mood and affect. His behavior is normal. Judgment and thought content normal.   Patient is very pleasant and talkative although somewhat childlike in his behavior and attitude.   Nursing note and vitals reviewed.              Assessment/Plan:     1. Controlled type 2 diabetes mellitus without complication, without long-term current use of insulin (HCC)  Patient doing well with his diabetes and I will refill his metformin to take once a day. I reviewed his lab results which all looks good. I explained that if he is able to lose weight and watch his carbohydrates, he may be a little off medicine in the future.  - metFORMIN (GLUCOPHAGE) 500 MG Tab; TAKE 1 TABLET BY MOUTH ONCE DAILY WITH  DINNER  Dispense: 90 Tab; Refill: 3    2. Dyslipidemia  Cholesterol well controlled and no side effects from his statin. I reviewed all the rest of his labs which also appear good. He will follow up in 6 months.

## 2018-09-07 ENCOUNTER — TELEPHONE (OUTPATIENT)
Dept: MEDICAL GROUP | Facility: MEDICAL CENTER | Age: 58
End: 2018-09-07

## 2018-09-07 DIAGNOSIS — E11.9 CONTROLLED TYPE 2 DIABETES MELLITUS WITHOUT COMPLICATION, WITHOUT LONG-TERM CURRENT USE OF INSULIN (HCC): ICD-10-CM

## 2018-09-07 NOTE — TELEPHONE ENCOUNTER
1. Caller Name: Mac Yoder                     Call Back Number: [phone] (home)       2. Message: Patient wife called, she would like to know if patient is supposed to get any labwork done before his appt on 3/6/19?    3. Patient approves office to leave a detailed voicemail/MyChart message: N\A

## 2018-09-07 NOTE — TELEPHONE ENCOUNTER
Left a detailed vm for pt letting him know labs have been ordered and if there is any questions to call me back

## 2018-09-10 DIAGNOSIS — E78.5 DYSLIPIDEMIA: ICD-10-CM

## 2018-09-11 RX ORDER — ATORVASTATIN CALCIUM 10 MG/1
TABLET, FILM COATED ORAL
Qty: 90 TAB | Refills: 3 | Status: SHIPPED | OUTPATIENT
Start: 2018-09-11 | End: 2018-09-18 | Stop reason: SDUPTHER

## 2018-09-18 DIAGNOSIS — E78.5 DYSLIPIDEMIA: ICD-10-CM

## 2018-09-18 RX ORDER — ATORVASTATIN CALCIUM 10 MG/1
TABLET, FILM COATED ORAL
Qty: 90 TAB | Refills: 3 | Status: SHIPPED | OUTPATIENT
Start: 2018-09-18 | End: 2019-03-07 | Stop reason: SDUPTHER

## 2019-01-21 ENCOUNTER — OFFICE VISIT (OUTPATIENT)
Dept: MEDICAL GROUP | Facility: MEDICAL CENTER | Age: 59
End: 2019-01-21
Payer: MEDICARE

## 2019-01-21 ENCOUNTER — HOSPITAL ENCOUNTER (OUTPATIENT)
Dept: RADIOLOGY | Facility: MEDICAL CENTER | Age: 59
End: 2019-01-21
Attending: NURSE PRACTITIONER
Payer: MEDICARE

## 2019-01-21 VITALS
DIASTOLIC BLOOD PRESSURE: 74 MMHG | BODY MASS INDEX: 27.3 KG/M2 | SYSTOLIC BLOOD PRESSURE: 128 MMHG | WEIGHT: 206 LBS | OXYGEN SATURATION: 96 % | HEIGHT: 73 IN | RESPIRATION RATE: 16 BRPM | HEART RATE: 86 BPM

## 2019-01-21 DIAGNOSIS — M25.512 ACUTE PAIN OF LEFT SHOULDER: ICD-10-CM

## 2019-01-21 DIAGNOSIS — E78.5 DYSLIPIDEMIA: ICD-10-CM

## 2019-01-21 DIAGNOSIS — E11.9 CONTROLLED TYPE 2 DIABETES MELLITUS WITHOUT COMPLICATION, WITHOUT LONG-TERM CURRENT USE OF INSULIN (HCC): ICD-10-CM

## 2019-01-21 DIAGNOSIS — S06.9X5S TRAUMATIC BRAIN INJURY WITH PROLONGED (MORE THAN 24 HOURS) LOSS OF CONSCIOUSNESS WITH RETURN TO PRE-EXISTING CONSCIOUS LEVEL, SEQUELA (HCC): ICD-10-CM

## 2019-01-21 DIAGNOSIS — Z00.00 MEDICARE ANNUAL WELLNESS VISIT, SUBSEQUENT: ICD-10-CM

## 2019-01-21 PROCEDURE — G0439 PPPS, SUBSEQ VISIT: HCPCS | Performed by: NURSE PRACTITIONER

## 2019-01-21 PROCEDURE — 99213 OFFICE O/P EST LOW 20 MIN: CPT | Mod: 25 | Performed by: NURSE PRACTITIONER

## 2019-01-21 PROCEDURE — 73030 X-RAY EXAM OF SHOULDER: CPT | Mod: LT

## 2019-01-21 RX ORDER — TIZANIDINE 4 MG/1
4 TABLET ORAL EVERY 6 HOURS PRN
Qty: 30 TAB | Refills: 3 | Status: SHIPPED | OUTPATIENT
Start: 2019-01-21 | End: 2020-05-20

## 2019-01-21 RX ORDER — METHYLPREDNISOLONE 4 MG/1
TABLET ORAL
Qty: 21 TAB | Refills: 0 | Status: SHIPPED | OUTPATIENT
Start: 2019-01-21 | End: 2020-05-20

## 2019-01-21 ASSESSMENT — ACTIVITIES OF DAILY LIVING (ADL): BATHING_REQUIRES_ASSISTANCE: 0

## 2019-01-21 ASSESSMENT — PATIENT HEALTH QUESTIONNAIRE - PHQ9: CLINICAL INTERPRETATION OF PHQ2 SCORE: 0

## 2019-01-21 ASSESSMENT — ENCOUNTER SYMPTOMS: GENERAL WELL-BEING: EXCELLENT

## 2019-01-22 NOTE — PROGRESS NOTES
CC: Patient here for annual Medicare wellness visit as well as new problem with left shoulder pain.    HPI:   Mac presents today with the following.    1. Medicare annual wellness visit, subsequent  Screening performed below.    2. Acute pain of left shoulder  Patient reports approximately 2 weeks ago he slipped on ice in front of his home.  He states he fell onto his left shoulder but was able to get up and get back inside the house.  He states since then, he has been having pain on the lateral portion of his left shoulder and upper arm.  The pain is only with movement of the arm and not at rest.  He states there is been no redness or swelling.  He denies paresthesias or weakness of the left arm.  There is some pain relief with using 800 mg of ibuprofen twice a day.    3. Controlled type 2 diabetes mellitus without complication, without long-term current use of insulin (HCC)  Patient states he continues with his metformin and his last hemoglobin A1c was good but he has not completed his follow-up hemoglobin A1c.    4. 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  He still has issues with memory.    5. Dyslipidemia  Patient continues to take Lipitor 10 mg daily and reports no myalgias.      Depression Screening    Little interest or pleasure in doing things?  0 - not at all  Feeling down, depressed , or hopeless? 0 - not at all  Patient Health Questionnaire Score: 0     If depressive symptoms identified deferred to follow up visit unless specifically addressed in assessment and plan.    Interpretation of PHQ-9 Total Score   Score Severity   1-4 No Depression   5-9 Mild Depression   10-14 Moderate Depression   15-19 Moderately Severe Depression   20-27 Severe Depression    Screening for Cognitive Impairment    Three Minute Recall (leader,  season, table) 3/3    Federico clock face with all 12 numbers and set the hands to show 10 past 11.  Yes    Cognitive concerns identified deferred for follow up unless specifically addressed in assessment and plan.    Fall Risk Assessment    Has the patient had two or more falls in the last year or any fall with injury in the last year?  No    Safety Assessment    Throw rugs on floor.  Yes  Handrails on all stairs.  Yes  Good lighting in all hallways.  Yes  Difficulty hearing.  No  Patient counseled about all safety risks that were identified.    Functional Assessment ADLs    Are there any barriers preventing you from cooking for yourself or meeting nutritional needs?  No.    Are there any barriers preventing you from driving safely or obtaining transportation?  No.    Are there any barriers preventing you from using a telephone or calling for help?  No.    Are there any barriers preventing you from shopping?  No.    Are there any barriers preventing you from taking care of your own finances?  No.    Are there any barriers preventing you from managing your medications?  No.    Are there any barriers preventing you from showering, bathing or dressing yourself?  No.    Are you currently engaging in any exercise or physical activity?  No.     What is your perception of your health?  Excellent.      Health Maintenance Summary                DIABETES MONOFILAMENT / LE EXAM Overdue 1959     RETINAL SCREENING Overdue 1/6/1977     IMM HEP B VACCINE Overdue 1/6/1978     IMM PNEUMOCOCCAL 19-64 (ADULT) MEDIUM RISK SERIES Overdue 1/6/1978     Annual Wellness Visit Overdue 8/18/2018      Done 8/17/2017 Visit Dx: Medicare annual wellness visit, subsequent     Patient has more history with this topic...    IMM INFLUENZA Overdue 9/1/2018     IMM ZOSTER VACCINES Postponed 9/8/2021 Originally 1/6/2009. Patient Refused    IMM DTaP/Tdap/Td Vaccine Postponed 9/13/2021 Originally 1/6/1978. Patient Refused    A1C SCREENING Next Due  2/27/2019      Done 8/27/2018 HEMOGLOBIN A1C      Patient has more history with this topic...    FASTING LIPID PROFILE Next Due 8/27/2019      Done 8/27/2018 LIPID PROFILE      Patient has more history with this topic...    URINE ACR / MICROALBUMIN Next Due 8/27/2019      Done 8/27/2018 MICROALBUMIN CREAT RATIO URINE    SERUM CREATININE Next Due 8/27/2019      Done 8/27/2018 COMP METABOLIC PANEL      Patient has more history with this topic...    COLONOSCOPY Next Due 11/5/2025      Patient Declined 11/5/2015 Does not want one     Patient has more history with this topic...          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 21 Tab 0   • tizanidine (ZANAFLEX) 4 MG Tab Take 1 Tab by mouth every 6 hours as needed. 30 Tab 3   • atorvastatin (LIPITOR) 10 MG Tab TAKE 1 TABLET BY MOUTH ONCE DAILY IN THE EVENING 90 Tab 3   • metFORMIN (GLUCOPHAGE) 500 MG Tab TAKE 1 TABLET BY MOUTH ONCE DAILY WITH  DINNER 90 Tab 3   • aspirin EC (ECOTRIN) 81 MG Tablet Delayed Response Take 81 mg by mouth every day.     • Multiple Vitamins-Minerals (CENTRUM SILVER ADULT 50+ PO) Take  by mouth.     • folic acid (FOLVITE) 1 MG Tab Take 1 mg by mouth every day.       No current facility-administered medications for this visit.          Allergies as of 01/21/2019   • (No Known Allergies)        ROS: As per HPI.    /74 (BP Location: Right arm, Patient Position: Sitting, BP Cuff Size:  "Adult)   Pulse 86   Resp 16   Ht 1.854 m (6' 1\")   Wt 93.4 kg (206 lb)   SpO2 96%   BMI 27.18 kg/m²     Physical Exam:  Gen:         Alert and oriented, No apparent distress.  Repeats himself frequently.  Neck:        No Lymphadenopathy or Bruits.  Lungs:     Clear to auscultation bilaterally  CV:          Regular rate and rhythm. No murmurs, rubs or gallops.  Abd:         Soft non tender, non distended. Normal active bowel sounds.  No  Hepatosplenomegaly, No pulsatile masses.                   Ext:          No clubbing, cyanosis, edema.  Musculoskeletal: Pain reported with elevation of his left arm beyond 50 degrees but no weakness or paresthesias present.  Mild pain to palpation and no bruising.    Assessment and Plan.   59 y.o. male with the following issues.    1. Medicare annual wellness visit, subsequent  Annual wellness topics discussed, review of chronic medical problems completed    - Subsequent Annual Wellness Visit - Includes PPPS ()    2. Acute pain of left shoulder  Patient is to stop the ibuprofen and I will start him on a Medrol Dosepak with Tylenol for pain.  He may also use a muscle relaxant when he does not need to be a alert.  If his x-ray is normal I recommended physical therapy which he declined.  He states he would consider therapy or referral to orthopedics if it does not start improving in the next 2 weeks.  - MethylPREDNISolone (MEDROL DOSEPAK) 4 MG Tablet Therapy Pack; As directed on the packaging label.  Dispense: 21 Tab; Refill: 0  - tizanidine (ZANAFLEX) 4 MG Tab; Take 1 Tab by mouth every 6 hours as needed.  Dispense: 30 Tab; Refill: 3  - DX-SHOULDER 2+ LEFT; Future    3. Controlled type 2 diabetes mellitus without complication, without long-term current use of insulin (HCC)  Patient reminded to do his lab work to assess the status of his type 2 diabetes.    4. 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    5. Dyslipidemia  Patient to do lab work to check on cholesterol which previously was well controlled on Lipitor.

## 2019-01-24 ENCOUNTER — TELEPHONE (OUTPATIENT)
Dept: MEDICAL GROUP | Facility: MEDICAL CENTER | Age: 59
End: 2019-01-24

## 2019-01-24 NOTE — TELEPHONE ENCOUNTER
He should follow the bottle directions which is 2 extra strength Tylenol up to every 6 hours as needed or Aleve, 2 tablets every 12 hours as needed.  He can alternate these.

## 2019-01-24 NOTE — TELEPHONE ENCOUNTER
1. Caller Name: Mac Yoder                                         Call Back Number: [phone] (home)       Patient approves a detailed voicemail message: yes    Pt called and is still having pain in his shoulder. He's wondering if he can take aleve and tylenol and how much should he be taking of this if so/ how often/ and for how long.

## 2019-02-25 ENCOUNTER — HOSPITAL ENCOUNTER (OUTPATIENT)
Dept: LAB | Facility: MEDICAL CENTER | Age: 59
End: 2019-02-25
Attending: NURSE PRACTITIONER
Payer: MEDICARE

## 2019-02-25 DIAGNOSIS — E11.9 CONTROLLED TYPE 2 DIABETES MELLITUS WITHOUT COMPLICATION, WITHOUT LONG-TERM CURRENT USE OF INSULIN (HCC): ICD-10-CM

## 2019-02-25 LAB
ALBUMIN SERPL BCP-MCNC: 4.9 G/DL (ref 3.2–4.9)
ALBUMIN/GLOB SERPL: 1.8 G/DL
ALP SERPL-CCNC: 57 U/L (ref 30–99)
ALT SERPL-CCNC: 26 U/L (ref 2–50)
ANION GAP SERPL CALC-SCNC: 8 MMOL/L (ref 0–11.9)
AST SERPL-CCNC: 20 U/L (ref 12–45)
BILIRUB SERPL-MCNC: 0.8 MG/DL (ref 0.1–1.5)
BUN SERPL-MCNC: 17 MG/DL (ref 8–22)
CALCIUM SERPL-MCNC: 10.4 MG/DL (ref 8.5–10.5)
CHLORIDE SERPL-SCNC: 103 MMOL/L (ref 96–112)
CO2 SERPL-SCNC: 28 MMOL/L (ref 20–33)
CREAT SERPL-MCNC: 0.97 MG/DL (ref 0.5–1.4)
EST. AVERAGE GLUCOSE BLD GHB EST-MCNC: 131 MG/DL
FASTING STATUS PATIENT QL REPORTED: NORMAL
GLOBULIN SER CALC-MCNC: 2.8 G/DL (ref 1.9–3.5)
GLUCOSE SERPL-MCNC: 102 MG/DL (ref 65–99)
HBA1C MFR BLD: 6.2 % (ref 0–5.6)
POTASSIUM SERPL-SCNC: 5 MMOL/L (ref 3.6–5.5)
PROT SERPL-MCNC: 7.7 G/DL (ref 6–8.2)
SODIUM SERPL-SCNC: 139 MMOL/L (ref 135–145)

## 2019-02-25 PROCEDURE — 83036 HEMOGLOBIN GLYCOSYLATED A1C: CPT | Mod: GA

## 2019-02-25 PROCEDURE — 80053 COMPREHEN METABOLIC PANEL: CPT

## 2019-02-25 PROCEDURE — 36415 COLL VENOUS BLD VENIPUNCTURE: CPT | Mod: GA

## 2019-03-07 DIAGNOSIS — E78.5 DYSLIPIDEMIA: ICD-10-CM

## 2019-03-07 RX ORDER — ATORVASTATIN CALCIUM 10 MG/1
TABLET, FILM COATED ORAL
Qty: 90 TAB | Refills: 3 | Status: SHIPPED | OUTPATIENT
Start: 2019-03-07 | End: 2020-05-18 | Stop reason: SDUPTHER

## 2019-04-01 ENCOUNTER — TELEPHONE (OUTPATIENT)
Dept: MEDICAL GROUP | Facility: MEDICAL CENTER | Age: 59
End: 2019-04-01

## 2019-04-02 NOTE — TELEPHONE ENCOUNTER
Pt lvm to call him back, I tried to call him back but when I dial his phone number that I have on file for him, it says that is disconnected or not longer in service

## 2019-06-01 DIAGNOSIS — E11.9 CONTROLLED TYPE 2 DIABETES MELLITUS WITHOUT COMPLICATION, WITHOUT LONG-TERM CURRENT USE OF INSULIN (HCC): ICD-10-CM

## 2020-05-18 DIAGNOSIS — E78.5 DYSLIPIDEMIA: ICD-10-CM

## 2020-05-18 RX ORDER — ATORVASTATIN CALCIUM 10 MG/1
TABLET, FILM COATED ORAL
Qty: 30 TAB | Refills: 0 | OUTPATIENT
Start: 2020-05-18

## 2020-05-18 RX ORDER — ATORVASTATIN CALCIUM 10 MG/1
TABLET, FILM COATED ORAL
Qty: 30 TAB | Refills: 0 | Status: SHIPPED | OUTPATIENT
Start: 2020-05-18 | End: 2020-05-20 | Stop reason: SDUPTHER

## 2020-05-20 ENCOUNTER — OFFICE VISIT (OUTPATIENT)
Dept: MEDICAL GROUP | Facility: MEDICAL CENTER | Age: 60
End: 2020-05-20
Payer: MEDICARE

## 2020-05-20 VITALS
HEART RATE: 72 BPM | WEIGHT: 207 LBS | BODY MASS INDEX: 27.43 KG/M2 | OXYGEN SATURATION: 97 % | HEIGHT: 73 IN | TEMPERATURE: 98.7 F | SYSTOLIC BLOOD PRESSURE: 128 MMHG | DIASTOLIC BLOOD PRESSURE: 70 MMHG | RESPIRATION RATE: 16 BRPM

## 2020-05-20 DIAGNOSIS — E78.5 DYSLIPIDEMIA: ICD-10-CM

## 2020-05-20 DIAGNOSIS — E11.9 CONTROLLED TYPE 2 DIABETES MELLITUS WITHOUT COMPLICATION, WITHOUT LONG-TERM CURRENT USE OF INSULIN (HCC): ICD-10-CM

## 2020-05-20 DIAGNOSIS — Z12.5 SCREENING FOR PROSTATE CANCER: ICD-10-CM

## 2020-05-20 DIAGNOSIS — Z11.59 NEED FOR HEPATITIS C SCREENING TEST: ICD-10-CM

## 2020-05-20 PROCEDURE — 99214 OFFICE O/P EST MOD 30 MIN: CPT | Performed by: NURSE PRACTITIONER

## 2020-05-20 RX ORDER — LOSARTAN POTASSIUM 25 MG/1
25 TABLET ORAL DAILY
Qty: 90 TAB | Refills: 3 | Status: SHIPPED | OUTPATIENT
Start: 2020-05-20 | End: 2021-05-10

## 2020-05-20 RX ORDER — ATORVASTATIN CALCIUM 10 MG/1
TABLET, FILM COATED ORAL
Qty: 90 TAB | Refills: 3 | Status: SHIPPED | OUTPATIENT
Start: 2020-05-20 | End: 2021-05-10

## 2020-05-20 ASSESSMENT — PATIENT HEALTH QUESTIONNAIRE - PHQ9: CLINICAL INTERPRETATION OF PHQ2 SCORE: 0

## 2020-05-20 NOTE — PROGRESS NOTES
Subjective:      Mac Yoder is a 60 y.o. male who presents with Follow-Up (med refill)        CC: Patient here today for yearly follow-up on diabetes and dyslipidemia.  He is accompanied by his partner Roxane and I have not seen patient since January of last year.    HPI       1. Dyslipidemia  Patient is currently on low-dose atorvastatin and he reports no side effects but he is overdue for his yearly blood work and I would like to see his LDL close to 70 on his statin.    2. Controlled type 2 diabetes mellitus without complication, without long-term current use of insulin (HCC)  Previous hemoglobin A1c was 6.2 on his low-dose metformin once a day.  He typically runs in the low 6 range.  He is on a statin and aspirin but is not currently on an ARB.    3. Need for hepatitis C screening test  Patient due for screening    4. Screening for prostate cancer  Patient overdue for yearly screening.  Past Medical History:   Diagnosis Date   • Hyperlipidemia      Social History     Socioeconomic History   • Marital status: Single     Spouse name: Not on file   • Number of children: Not on file   • Years of education: Not on file   • Highest education level: Not on file   Occupational History   • Not on file   Social Needs   • Financial resource strain: Not on file   • Food insecurity     Worry: Not on file     Inability: Not on file   • Transportation needs     Medical: Not on file     Non-medical: Not on file   Tobacco Use   • Smoking status: Never Smoker   • Smokeless tobacco: Never Used   Substance and Sexual Activity   • Alcohol use: Yes     Alcohol/week: 0.6 oz     Types: 1 Cans of beer per week   • Drug use: No   • Sexual activity: Yes     Partners: Female   Lifestyle   • Physical activity     Days per week: Not on file     Minutes per session: Not on file   • Stress: Not on file   Relationships   • Social connections     Talks on phone: Not on file     Gets together: Not on file     Attends [employer] service: Not on  "file     Active member of club or organization: Not on file     Attends meetings of clubs or organizations: Not on file     Relationship status: Not on file   • Intimate partner violence     Fear of current or ex partner: Not on file     Emotionally abused: Not on file     Physically abused: Not on file     Forced sexual activity: Not on file   Other Topics Concern   • Not on file   Social History Narrative   • Not on file     Current Outpatient Medications   Medication Sig Dispense Refill   • atorvastatin (LIPITOR) 10 MG Tab TAKE 1 TABLET BY MOUTH ONCE DAILY IN THE EVENING 90 Tab 3   • metFORMIN (GLUCOPHAGE) 500 MG Tab Take 1 Tab by mouth every day. 90 Tab 3   • losartan (COZAAR) 25 MG Tab Take 1 Tab by mouth every day. 90 Tab 3   • aspirin EC (ECOTRIN) 81 MG Tablet Delayed Response Take 81 mg by mouth every day.     • Multiple Vitamins-Minerals (CENTRUM SILVER ADULT 50+ PO) Take  by mouth.     • folic acid (FOLVITE) 1 MG Tab Take 1 mg by mouth every day.       No current facility-administered medications for this visit.      Family History   Problem Relation Age of Onset   • Other Mother    • Other Father         Cholesterol         Review of Systems   All other systems reviewed and are negative.         Objective:     /70 (BP Location: Left arm, Patient Position: Sitting, BP Cuff Size: Adult)   Pulse 72   Temp 37.1 °C (98.7 °F) (Temporal)   Resp 16   Ht 1.854 m (6' 1\")   Wt 93.9 kg (207 lb)   SpO2 97%   BMI 27.31 kg/m²      Physical Exam  Vitals signs and nursing note reviewed.   Constitutional:       General: He is not in acute distress.     Appearance: He is well-developed. He is not diaphoretic.   HENT:      Head: Normocephalic and atraumatic.      Right Ear: External ear normal.      Left Ear: External ear normal.      Nose: Nose normal.   Eyes:      General:         Right eye: No discharge.         Left eye: No discharge.      Conjunctiva/sclera: Conjunctivae normal.   Neck:      " Musculoskeletal: Normal range of motion and neck supple.      Thyroid: No thyromegaly.      Trachea: No tracheal deviation.   Cardiovascular:      Rate and Rhythm: Normal rate and regular rhythm.      Heart sounds: Normal heart sounds. No murmur.   Pulmonary:      Effort: Pulmonary effort is normal. No respiratory distress.      Breath sounds: Normal breath sounds. No wheezing or rales.   Lymphadenopathy:      Cervical: No cervical adenopathy.   Skin:     General: Skin is warm and dry.      Findings: No erythema or rash.   Neurological:      Mental Status: He is alert and oriented to person, place, and time.      Coordination: Coordination normal.   Psychiatric:         Behavior: Behavior normal.         Thought Content: Thought content normal.         Cognition and Memory: Memory is impaired.         Judgment: Judgment normal.      Comments: Very pleasant but frequently asks Roxane for confirmation on answers to questions.                 Assessment/Plan:       1. Dyslipidemia  Patient reminded that he needs to do lab work at least yearly and more often if we make changes in medication dosing.  I would like to see his LDL around 70 with his underlying health history.  - Lipid Profile; Future  - atorvastatin (LIPITOR) 10 MG Tab; TAKE 1 TABLET BY MOUTH ONCE DAILY IN THE EVENING  Dispense: 90 Tab; Refill: 3    2. Controlled type 2 diabetes mellitus without complication, without long-term current use of insulin (HCC)  I recommended visits at least every 6 months and more often if his hemoglobin A1c is above 7.  I told him if his hemoglobin A1c is running in the 6 range I will probably increase his metformin to twice a day.  I will also start him on a low-dose ARB today.  He will continue with his aspirin and statin.  - MICROALBUMIN CREAT RATIO URINE; Future  - Comp Metabolic Panel; Future  - REFERRAL TO OPHTHALMOLOGY  - HEMOGLOBIN A1C; Future  - metFORMIN (GLUCOPHAGE) 500 MG Tab; Take 1 Tab by mouth every day.   Dispense: 90 Tab; Refill: 3  - losartan (COZAAR) 25 MG Tab; Take 1 Tab by mouth every day.  Dispense: 90 Tab; Refill: 3    3. Need for hepatitis C screening test    - HEP C VIRUS ANTIBODY; Future    4. Screening for prostate cancer    - PROSTATE SPECIFIC AG SCREENING; Future

## 2020-05-28 ENCOUNTER — HOSPITAL ENCOUNTER (OUTPATIENT)
Dept: LAB | Facility: MEDICAL CENTER | Age: 60
End: 2020-05-28
Attending: NURSE PRACTITIONER
Payer: MEDICARE

## 2020-05-28 DIAGNOSIS — E78.5 DYSLIPIDEMIA: ICD-10-CM

## 2020-05-28 DIAGNOSIS — Z12.5 SCREENING FOR PROSTATE CANCER: ICD-10-CM

## 2020-05-28 DIAGNOSIS — E11.9 CONTROLLED TYPE 2 DIABETES MELLITUS WITHOUT COMPLICATION, WITHOUT LONG-TERM CURRENT USE OF INSULIN (HCC): ICD-10-CM

## 2020-05-28 DIAGNOSIS — Z11.59 NEED FOR HEPATITIS C SCREENING TEST: ICD-10-CM

## 2020-05-28 LAB
ALBUMIN SERPL BCP-MCNC: 4.8 G/DL (ref 3.2–4.9)
ALBUMIN/GLOB SERPL: 1.7 G/DL
ALP SERPL-CCNC: 52 U/L (ref 30–99)
ALT SERPL-CCNC: 27 U/L (ref 2–50)
ANION GAP SERPL CALC-SCNC: 13 MMOL/L (ref 7–16)
AST SERPL-CCNC: 27 U/L (ref 12–45)
BILIRUB SERPL-MCNC: 0.8 MG/DL (ref 0.1–1.5)
BUN SERPL-MCNC: 17 MG/DL (ref 8–22)
CALCIUM SERPL-MCNC: 9.4 MG/DL (ref 8.5–10.5)
CHLORIDE SERPL-SCNC: 101 MMOL/L (ref 96–112)
CHOLEST SERPL-MCNC: 169 MG/DL (ref 100–199)
CO2 SERPL-SCNC: 24 MMOL/L (ref 20–33)
CREAT SERPL-MCNC: 0.87 MG/DL (ref 0.5–1.4)
CREAT UR-MCNC: 142.71 MG/DL
FASTING STATUS PATIENT QL REPORTED: NORMAL
GLOBULIN SER CALC-MCNC: 2.9 G/DL (ref 1.9–3.5)
GLUCOSE SERPL-MCNC: 101 MG/DL (ref 65–99)
HDLC SERPL-MCNC: 37 MG/DL
LDLC SERPL CALC-MCNC: 106 MG/DL
MICROALBUMIN UR-MCNC: <1.2 MG/DL
MICROALBUMIN/CREAT UR: NORMAL MG/G (ref 0–30)
POTASSIUM SERPL-SCNC: 4.7 MMOL/L (ref 3.6–5.5)
PROT SERPL-MCNC: 7.7 G/DL (ref 6–8.2)
SODIUM SERPL-SCNC: 138 MMOL/L (ref 135–145)
TRIGL SERPL-MCNC: 129 MG/DL (ref 0–149)

## 2020-05-28 PROCEDURE — 86803 HEPATITIS C AB TEST: CPT

## 2020-05-28 PROCEDURE — 83036 HEMOGLOBIN GLYCOSYLATED A1C: CPT | Mod: GA

## 2020-05-28 PROCEDURE — 84153 ASSAY OF PSA TOTAL: CPT | Mod: GA

## 2020-05-28 PROCEDURE — 80053 COMPREHEN METABOLIC PANEL: CPT

## 2020-05-28 PROCEDURE — 36415 COLL VENOUS BLD VENIPUNCTURE: CPT | Mod: GA

## 2020-05-28 PROCEDURE — 82043 UR ALBUMIN QUANTITATIVE: CPT

## 2020-05-28 PROCEDURE — 80061 LIPID PANEL: CPT

## 2020-05-28 PROCEDURE — 82570 ASSAY OF URINE CREATININE: CPT

## 2020-05-29 LAB
EST. AVERAGE GLUCOSE BLD GHB EST-MCNC: 134 MG/DL
HBA1C MFR BLD: 6.3 % (ref 0–5.6)
HCV AB SER QL: NORMAL
PSA SERPL-MCNC: 0.42 NG/ML (ref 0–4)

## 2020-11-10 ENCOUNTER — TELEPHONE (OUTPATIENT)
Dept: MEDICAL GROUP | Facility: MEDICAL CENTER | Age: 60
End: 2020-11-10

## 2020-11-20 ENCOUNTER — TELEMEDICINE (OUTPATIENT)
Dept: MEDICAL GROUP | Facility: MEDICAL CENTER | Age: 60
End: 2020-11-20
Payer: MEDICARE

## 2020-11-20 VITALS — HEIGHT: 73 IN | BODY MASS INDEX: 25.84 KG/M2 | WEIGHT: 195 LBS | RESPIRATION RATE: 16 BRPM

## 2020-11-20 DIAGNOSIS — Z12.5 SCREENING FOR PROSTATE CANCER: ICD-10-CM

## 2020-11-20 DIAGNOSIS — S06.9X5S TRAUMATIC BRAIN INJURY WITH PROLONGED (MORE THAN 24 HOURS) LOSS OF CONSCIOUSNESS WITH RETURN TO PRE-EXISTING CONSCIOUS LEVEL, SEQUELA (HCC): ICD-10-CM

## 2020-11-20 DIAGNOSIS — E78.5 DYSLIPIDEMIA: ICD-10-CM

## 2020-11-20 DIAGNOSIS — E11.9 CONTROLLED TYPE 2 DIABETES MELLITUS WITHOUT COMPLICATION, WITHOUT LONG-TERM CURRENT USE OF INSULIN (HCC): ICD-10-CM

## 2020-12-04 ENCOUNTER — TELEMEDICINE (OUTPATIENT)
Dept: MEDICAL GROUP | Facility: MEDICAL CENTER | Age: 60
End: 2020-12-04
Payer: MEDICARE

## 2020-12-04 VITALS — BODY MASS INDEX: 25.18 KG/M2 | WEIGHT: 190 LBS | RESPIRATION RATE: 18 BRPM | HEIGHT: 73 IN

## 2020-12-04 DIAGNOSIS — S06.9X5S TRAUMATIC BRAIN INJURY WITH PROLONGED (MORE THAN 24 HOURS) LOSS OF CONSCIOUSNESS WITH RETURN TO PRE-EXISTING CONSCIOUS LEVEL, SEQUELA (HCC): ICD-10-CM

## 2020-12-04 DIAGNOSIS — E78.5 DYSLIPIDEMIA: ICD-10-CM

## 2020-12-04 DIAGNOSIS — E11.9 CONTROLLED TYPE 2 DIABETES MELLITUS WITHOUT COMPLICATION, WITHOUT LONG-TERM CURRENT USE OF INSULIN (HCC): ICD-10-CM

## 2020-12-04 PROCEDURE — 99213 OFFICE O/P EST LOW 20 MIN: CPT | Mod: 95,CS | Performed by: NURSE PRACTITIONER
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
## 2021-03-15 DIAGNOSIS — Z23 NEED FOR VACCINATION: ICD-10-CM

## 2021-05-09 DIAGNOSIS — E78.5 DYSLIPIDEMIA: ICD-10-CM

## 2021-05-09 DIAGNOSIS — E11.9 CONTROLLED TYPE 2 DIABETES MELLITUS WITHOUT COMPLICATION, WITHOUT LONG-TERM CURRENT USE OF INSULIN (HCC): ICD-10-CM

## 2021-05-10 RX ORDER — LOSARTAN POTASSIUM 25 MG/1
TABLET ORAL
Qty: 90 TABLET | Refills: 0 | Status: SHIPPED | OUTPATIENT
Start: 2021-05-10 | End: 2021-06-30 | Stop reason: SDUPTHER

## 2021-05-10 RX ORDER — ATORVASTATIN CALCIUM 10 MG/1
TABLET, FILM COATED ORAL
Qty: 90 TABLET | Refills: 0 | Status: SHIPPED | OUTPATIENT
Start: 2021-05-10 | End: 2021-06-30 | Stop reason: SDUPTHER

## 2021-05-12 ENCOUNTER — HOSPITAL ENCOUNTER (OUTPATIENT)
Dept: LAB | Facility: MEDICAL CENTER | Age: 61
End: 2021-05-12
Attending: NURSE PRACTITIONER
Payer: MEDICARE

## 2021-05-12 DIAGNOSIS — Z12.5 SCREENING FOR PROSTATE CANCER: ICD-10-CM

## 2021-05-12 DIAGNOSIS — E11.9 CONTROLLED TYPE 2 DIABETES MELLITUS WITHOUT COMPLICATION, WITHOUT LONG-TERM CURRENT USE OF INSULIN (HCC): ICD-10-CM

## 2021-05-12 DIAGNOSIS — E78.5 DYSLIPIDEMIA: ICD-10-CM

## 2021-05-12 LAB
ALBUMIN SERPL BCP-MCNC: 4.4 G/DL (ref 3.2–4.9)
ALBUMIN/GLOB SERPL: 1.5 G/DL
ALP SERPL-CCNC: 51 U/L (ref 30–99)
ALT SERPL-CCNC: 33 U/L (ref 2–50)
ANION GAP SERPL CALC-SCNC: 9 MMOL/L (ref 7–16)
AST SERPL-CCNC: 24 U/L (ref 12–45)
BILIRUB SERPL-MCNC: 0.4 MG/DL (ref 0.1–1.5)
BUN SERPL-MCNC: 17 MG/DL (ref 8–22)
CALCIUM SERPL-MCNC: 9.6 MG/DL (ref 8.5–10.5)
CHLORIDE SERPL-SCNC: 105 MMOL/L (ref 96–112)
CHOLEST SERPL-MCNC: 143 MG/DL (ref 100–199)
CO2 SERPL-SCNC: 27 MMOL/L (ref 20–33)
CREAT SERPL-MCNC: 0.94 MG/DL (ref 0.5–1.4)
CREAT UR-MCNC: 183.88 MG/DL
ERYTHROCYTE [DISTWIDTH] IN BLOOD BY AUTOMATED COUNT: 45.4 FL (ref 35.9–50)
EST. AVERAGE GLUCOSE BLD GHB EST-MCNC: 123 MG/DL
FASTING STATUS PATIENT QL REPORTED: NORMAL
GLOBULIN SER CALC-MCNC: 3 G/DL (ref 1.9–3.5)
GLUCOSE SERPL-MCNC: 104 MG/DL (ref 65–99)
HBA1C MFR BLD: 5.9 % (ref 4–5.6)
HCT VFR BLD AUTO: 47.4 % (ref 42–52)
HDLC SERPL-MCNC: 36 MG/DL
HGB BLD-MCNC: 15.7 G/DL (ref 14–18)
LDLC SERPL CALC-MCNC: 73 MG/DL
MCH RBC QN AUTO: 30.7 PG (ref 27–33)
MCHC RBC AUTO-ENTMCNC: 33.1 G/DL (ref 33.7–35.3)
MCV RBC AUTO: 92.6 FL (ref 81.4–97.8)
MICROALBUMIN UR-MCNC: <1.2 MG/DL
MICROALBUMIN/CREAT UR: NORMAL MG/G (ref 0–30)
PLATELET # BLD AUTO: 238 K/UL (ref 164–446)
PMV BLD AUTO: 10.4 FL (ref 9–12.9)
POTASSIUM SERPL-SCNC: 4.8 MMOL/L (ref 3.6–5.5)
PROT SERPL-MCNC: 7.4 G/DL (ref 6–8.2)
PSA SERPL-MCNC: 0.33 NG/ML (ref 0–4)
RBC # BLD AUTO: 5.12 M/UL (ref 4.7–6.1)
SODIUM SERPL-SCNC: 141 MMOL/L (ref 135–145)
TRIGL SERPL-MCNC: 172 MG/DL (ref 0–149)
WBC # BLD AUTO: 6 K/UL (ref 4.8–10.8)

## 2021-05-12 PROCEDURE — 82570 ASSAY OF URINE CREATININE: CPT

## 2021-05-12 PROCEDURE — 80061 LIPID PANEL: CPT

## 2021-05-12 PROCEDURE — 82043 UR ALBUMIN QUANTITATIVE: CPT

## 2021-05-12 PROCEDURE — 85027 COMPLETE CBC AUTOMATED: CPT

## 2021-05-12 PROCEDURE — 80053 COMPREHEN METABOLIC PANEL: CPT

## 2021-05-12 PROCEDURE — 83036 HEMOGLOBIN GLYCOSYLATED A1C: CPT | Mod: GA

## 2021-05-12 PROCEDURE — 36415 COLL VENOUS BLD VENIPUNCTURE: CPT | Mod: GA

## 2021-05-12 PROCEDURE — 84153 ASSAY OF PSA TOTAL: CPT | Mod: GA

## 2021-06-30 ENCOUNTER — TELEMEDICINE (OUTPATIENT)
Dept: MEDICAL GROUP | Facility: MEDICAL CENTER | Age: 61
End: 2021-06-30
Payer: MEDICARE

## 2021-06-30 DIAGNOSIS — E78.5 DYSLIPIDEMIA: ICD-10-CM

## 2021-06-30 DIAGNOSIS — S06.9X5S TRAUMATIC BRAIN INJURY WITH PROLONGED (MORE THAN 24 HOURS) LOSS OF CONSCIOUSNESS WITH RETURN TO PRE-EXISTING CONSCIOUS LEVEL, SEQUELA (HCC): ICD-10-CM

## 2021-06-30 DIAGNOSIS — E11.9 CONTROLLED TYPE 2 DIABETES MELLITUS WITHOUT COMPLICATION, WITHOUT LONG-TERM CURRENT USE OF INSULIN (HCC): ICD-10-CM

## 2021-06-30 DIAGNOSIS — Z00.00 MEDICARE ANNUAL WELLNESS VISIT, SUBSEQUENT: ICD-10-CM

## 2021-06-30 PROCEDURE — G0439 PPPS, SUBSEQ VISIT: HCPCS | Mod: 95 | Performed by: NURSE PRACTITIONER

## 2021-06-30 RX ORDER — ATORVASTATIN CALCIUM 10 MG/1
10 TABLET, FILM COATED ORAL
Qty: 90 TABLET | Refills: 3 | Status: SHIPPED | OUTPATIENT
Start: 2021-06-30 | End: 2022-07-11 | Stop reason: SDUPTHER

## 2021-06-30 RX ORDER — LOSARTAN POTASSIUM 25 MG/1
25 TABLET ORAL DAILY
Qty: 90 TABLET | Refills: 3 | Status: SHIPPED | OUTPATIENT
Start: 2021-06-30 | End: 2022-07-11 | Stop reason: SDUPTHER

## 2021-06-30 ASSESSMENT — ACTIVITIES OF DAILY LIVING (ADL): BATHING_REQUIRES_ASSISTANCE: 0

## 2021-06-30 ASSESSMENT — ENCOUNTER SYMPTOMS: GENERAL WELL-BEING: EXCELLENT

## 2021-06-30 ASSESSMENT — PATIENT HEALTH QUESTIONNAIRE - PHQ9: CLINICAL INTERPRETATION OF PHQ2 SCORE: 0

## 2021-06-30 NOTE — PROGRESS NOTES
Chief Complaint   Patient presents with   • Annual Wellness Visit     Telemedicine Video Visit: Established Patient   This Remote Face to Face encounter was conducted via Zoom. Given the importance of social distancing and other strategies recommended to reduce the risk of COVID-19 transmission, I am providing medical care to this patient via audio/video visit in place of an in person visit at the request of the patient. Verbal consent to telehealth, risks, benefits, and consequences were discussed. Patient retains the right to withdraw at any time. All existing confidentiality protections apply. The patient has access to all transmitted medical information. No dissemination of any patient images or information to other entities without further written consent.  Subjective:     Chief Complaint   Patient presents with   • Annual Wellness Visit       HPI:  Mac is a 61 y.o. here for Medicare Annual Wellness Visit    1. Medicare annual wellness visit, subsequent  Here for medicare annual wellness.     2. Controlled type 2 diabetes mellitus without complication, without long-term current use of insulin (HCC)  5.9% at this time. Taking Metformin for this.     3. 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 No significant changes. Memory issues remain present.     4. Dyslipidemia  On Atorvastatin for this. No myalgias. Last lipid panel 5/2021.       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 90 tablet 3   • metFORMIN (GLUCOPHAGE) 500 MG Tab Take 1 tablet by mouth every day. 90 tablet 3   • losartan (COZAAR) 25 MG Tab Take 1 tablet by mouth every day. 90 tablet 3   • aspirin EC (ECOTRIN) 81 MG Tablet Delayed Response Take 81 mg by mouth every day.     • Multiple Vitamins-Minerals (CENTRUM SILVER ADULT 50+ PO) Take  by mouth.     • folic acid (FOLVITE) 1 MG Tab Take 1 mg by mouth every day.       No current facility-administered medications for this visit.        Patient is taking medications as noted in medication list.  Current supplements as per medication list.     Allergies: Patient has no known allergies.    Current social contact/activities:     Is patient current with immunizations? Yes.    He  reports that he has never smoked. He has never used smokeless tobacco. He reports current alcohol use of about 0.6 oz of alcohol per week. He reports that he does not use drugs.  Counseling given: Not Answered        DPA/Advanced directive: Patient has Advanced Directive on file.     ROS:    Gait: Uses no assistive device   Ostomy: No   Other tubes: No   Amputations: No   Chronic oxygen use No   Last eye exam 12/10/2020   Wears hearing aids: No   : Denies any urinary leakage during the last 6 months      Screening:        Depression Screening    Little interest or pleasure in doing things?  0 - not at all  Feeling down, depressed, or hopeless? 0 - not at all  Patient Health Questionnaire Score: 0    If depressive symptoms identified deferred to follow up visit unless specifically addressed in assessment and plan.    Interpretation of PHQ-9 Total Score   Score Severity   1-4 No Depression   5-9 Mild Depression   10-14 Moderate Depression   15-19 Moderately Severe Depression   20-27 Severe Depression    Screening for Cognitive Impairment    Three Minute Recall (captain, garden, picture)  1/3    [place] clock face with all 12 numbers and set the hands to show 5 past 8.  Yes    If cognitive concerns  identified, deferred for follow up unless specifically addressed in assessment and plan.    Fall Risk Assessment    Has the patient had two or more falls in the last year or any fall with injury in the last year?  No  If fall risk identified, deferred for follow up unless specifically addressed in assessment and plan.    Safety Assessment    Throw rugs on floor.  Yes  Handrails on all stairs.  Yes  Good lighting in all hallways.  Yes  Difficulty hearing.  No  Patient counseled about all safety risks that were identified.    Functional Assessment ADLs    Are there any barriers preventing you from cooking for yourself or meeting nutritional needs?  No.    Are there any barriers preventing you from driving safely or obtaining transportation?  No.    Are there any barriers preventing you from using a telephone or calling for help?  No.    Are there any barriers preventing you from shopping?  No.    Are there any barriers preventing you from taking care of your own finances?  No.    Are there any barriers preventing you from managing your medications?  No.    Are there any barriers preventing you from showering, bathing or dressing yourself?  No.    Are you currently engaging in any exercise or physical activity?  Yes.  Walking daily.  What is your perception of your health?  Excellent.    Health Maintenance Summary                DIABETES MONOFILAMENT / LE EXAM Overdue 5/20/2021      Done 5/20/2020     IMM ZOSTER VACCINES Postponed 9/8/2021 Originally 1/6/2009. Patient Refused    IMM PNEUMOCOCCAL VACCINE: 0-64 Years Postponed 11/20/2021 Originally 1/6/1965. Patient Refused    IMM INFLUENZA Next Due 9/1/2021      Done 9/28/2013 Imm Admin: Influenza Seasonal Injectable - Historical Data    A1C SCREENING Next Due 11/12/2021      Done 5/12/2021 HEMOGLOBIN A1C     Patient has more history with this topic...    RETINAL SCREENING Next Due 12/10/2021      Done 12/10/2020 REFERRAL FOR RETINAL SCREENING EXAM    FASTING LIPID  PROFILE Next Due 5/12/2022      Done 5/12/2021 LIPID PROFILE     Patient has more history with this topic...    URINE ACR / MICROALBUMIN Next Due 5/12/2022      Done 5/12/2021 MICROALBUMIN CREAT RATIO URINE     Patient has more history with this topic...    SERUM CREATININE Next Due 5/12/2022      Done 5/12/2021 COMP METABOLIC PANEL     Patient has more history with this topic...    Annual Wellness Visit Next Due 7/6/2022      Done 7/5/2021 SUBSEQUENT ANNUAL WELLNESS VISIT-INCLUDES PPPS ()     Patient has more history with this topic...    COLONOSCOPY Next Due 11/5/2025      Patient Declined 11/5/2015 Does not want one     Patient has more history with this topic...    IMM DTaP/Tdap/Td Vaccine Next Due 8/17/2027      Done 8/17/2017 Imm Admin: Tdap Vaccine          Patient Care Team:  YELENA De as PCP - General (Family Medicine)    Social History     Tobacco Use   • Smoking status: Never Smoker   • Smokeless tobacco: Never Used   Substance Use Topics   • Alcohol use: Yes     Alcohol/week: 0.6 oz     Types: 1 Cans of beer per week   • Drug use: No     Family History   Problem Relation Age of Onset   • Other Mother    • Other Father         Cholesterol     He  has a past medical history of Hyperlipidemia.   History reviewed. No pertinent surgical history.        Exam:     There were no vitals taken for this visit. There is no height or weight on file to calculate BMI.    Hearing good.    Dentition good  Alert, oriented in no acute distress.  Eye contact is good, speech goal directed, affect calm      Assessment and Plan. The following treatment and monitoring plan is recommended:   1. Medicare annual wellness visit, subsequent   Have discussed recommend screenings and immunizations.  Subsequent Annual Wellness Visit - Includes PPPS ()   2. Controlled type 2 diabetes mellitus without complication, without long-term current use of insulin (HCC)   Stable on current regimen. A1c is 5.9%. continue  Metformin. Follow up yearly.  metFORMIN (GLUCOPHAGE) 500 MG Tab    losartan (COZAAR) 25 MG Tab    Subsequent Annual Wellness Visit - Includes PPPS ()   3. 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 No further improvement expected.  Subsequent Annual Wellness Visit - Includes PPPS ()   4. Dyslipidemia   Chronic. On statin therapy without myalgias. Goal for LDL to be <70. Continue current statin therapy. Lipid panel up to date.  atorvastatin (LIPITOR) 10 MG Tab    Subsequent Annual Wellness Visit - Includes PPPS ()         Services suggested: No services needed at this time  Health Care Screening recommendations as per orders if indicated.  Referrals offered: PT/OT/Nutrition counseling/Behavioral Health/Smoking cessation as per orders if indicated.    Discussion today about general wellness and lifestyle habits:    · Prevent falls and reduce trip hazards; Cautioned about securing or removing rugs.  · Have a working fire alarm and carbon monoxide detector;   · Engage in regular physical activity and social activities       Follow-up: Return in about 1 year (around 6/30/2022). Dictation #1  MRN:[id number]  CSN:[id number]

## 2022-07-11 ENCOUNTER — TELEMEDICINE (OUTPATIENT)
Dept: MEDICAL GROUP | Facility: MEDICAL CENTER | Age: 62
End: 2022-07-11
Payer: MEDICARE

## 2022-07-11 DIAGNOSIS — Z12.5 SCREENING FOR PROSTATE CANCER: ICD-10-CM

## 2022-07-11 DIAGNOSIS — S06.9X5S TRAUMATIC BRAIN INJURY WITH PROLONGED (MORE THAN 24 HOURS) LOSS OF CONSCIOUSNESS WITH RETURN TO PRE-EXISTING CONSCIOUS LEVEL, SEQUELA (HCC): ICD-10-CM

## 2022-07-11 DIAGNOSIS — E78.5 DYSLIPIDEMIA: ICD-10-CM

## 2022-07-11 DIAGNOSIS — E11.9 CONTROLLED TYPE 2 DIABETES MELLITUS WITHOUT COMPLICATION, WITHOUT LONG-TERM CURRENT USE OF INSULIN (HCC): ICD-10-CM

## 2022-07-11 PROBLEM — S06.9X5A TRAUMATIC BRAIN INJURY WITH PROLONGED (MORE THAN 24 HOURS) LOSS OF CONSCIOUSNESS WITH RETURN TO PRE-EXISTING CONSCIOUS LEVEL (HCC): Chronic | Status: ACTIVE | Noted: 2017-08-17

## 2022-07-11 PROCEDURE — 99214 OFFICE O/P EST MOD 30 MIN: CPT | Mod: 95 | Performed by: NURSE PRACTITIONER

## 2022-07-11 RX ORDER — ATORVASTATIN CALCIUM 10 MG/1
10 TABLET, FILM COATED ORAL
Qty: 90 TABLET | Refills: 3 | Status: SHIPPED | OUTPATIENT
Start: 2022-07-11 | End: 2022-10-25

## 2022-07-11 RX ORDER — LOSARTAN POTASSIUM 25 MG/1
25 TABLET ORAL DAILY
Qty: 90 TABLET | Refills: 3 | Status: SHIPPED | OUTPATIENT
Start: 2022-07-11 | End: 2022-10-25

## 2022-07-11 ASSESSMENT — PATIENT HEALTH QUESTIONNAIRE - PHQ9: CLINICAL INTERPRETATION OF PHQ2 SCORE: 0

## 2022-07-11 NOTE — PROGRESS NOTES
Telemedicine Video Visit: Established Patient   This Remote Face to Face encounter was conducted via Zoom. Given the importance of social distancing and other strategies recommended to reduce the risk of COVID-19 transmission, I am providing medical care to this patient via audio/video visit in place of an in person visit at the request of the patient. Verbal consent to telehealth, risks, benefits, and consequences were discussed. Patient retains the right to withdraw at any time. All existing confidentiality protections apply. The patient has access to all transmitted medical information. No dissemination of any patient images or information to other entities without further written consent.  Subjective:     Chief Complaint   Patient presents with   • Diabetes       Mac Yoder is a 62 y.o. male presenting for evaluation and management of:    Controlled type 2 diabetes mellitus without complication, without long-term current use of insulin ([facility])  Taking Metformin for this.  No GI side effects.  Last A1c 5.9%  Denies numbness tingling in his feet.  Due for routine labs. Due for routine eye exam.  He does not have a regular exercise routine however he does walk with his partner occasionally.  Due for monofilament    Dyslipidemia  Currently taking atorvastatin 10 mg for this.  Due for routine labs.    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  Significant other does assist with his memory recall.  Still struggles with short-term memory.  Very pleasant.  Speaks well.    Up-to-date on dental exams.    ROS   Denies any recent fevers or chills. No nausea or vomiting. No chest pains or shortness of breath.     No Known Allergies    Current medicines (including changes today)  Current Outpatient Medications   Medication Sig Dispense Refill   • atorvastatin (LIPITOR) 10 MG Tab Take 1 Tablet by mouth at bedtime. 90  Tablet 3   • metFORMIN (GLUCOPHAGE) 500 MG Tab Take 1 Tablet by mouth every day. 90 Tablet 3   • losartan (COZAAR) 25 MG Tab Take 1 Tablet by mouth every day. 90 Tablet 3   • aspirin EC (ECOTRIN) 81 MG Tablet Delayed Response Take 81 mg by mouth every day.     • Multiple Vitamins-Minerals (CENTRUM SILVER ADULT 50+ PO) Take  by mouth.     • folic acid (FOLVITE) 1 MG Tab Take 1 mg by mouth every day.       No current facility-administered medications for this visit.       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  He  has no past surgical history on file.       Objective:   Vitals obtained by patient:  There were no vitals taken for this visit.    Physical Exam:  Constitutional: Alert, no distress, well-groomed.  Skin: No rashes in visible areas.  Eye: Round. Conjunctiva clear, lids normal. No icterus.   ENMT: Lips pink without lesions, good dentition, moist mucous membranes. Phonation normal.  Neck: No masses, no thyromegaly. Moves freely without pain.  CV: Pulse as reported by patient  Respiratory: Unlabored respiratory effort, no cough or audible wheeze  Psych: Alert and oriented x3, normal affect and mood.       Assessment and Plan:   The following treatment plan was discussed:     1. Controlled type 2 diabetes mellitus without complication, without long-term current use of insulin ([facility])  Stable. Continue current medication regimen. Encouraged heart healthy diabetic diet and regular exercise. Check feet daily.   Reminded patient about retinal exam.  Due for routine labs.    - MICROALBUMIN CREAT RATIO URINE; Future  - Comp Metabolic Panel; Future  - CBC WITHOUT DIFFERENTIAL; Future  - HEMOGLOBIN A1C; Future  - metFORMIN (GLUCOPHAGE) 500 MG Tab; Take 1 Tablet by mouth every day.  Dispense: 90 Tablet; Refill: 3  - losartan (COZAAR) 25 MG Tab; Take 1 Tablet by mouth every day.  Dispense: 90 Tablet; Refill: 3    2. Dyslipidemia  Controlled with current statin.  Previous LDL at 73.  Goal LDL below 70.  Recommend increasing activity.  No myalgias.  Continue current regimen.  - Lipid Profile; Future  - Comp Metabolic Panel; Future  - atorvastatin (LIPITOR) 10 MG Tab; Take 1 Tablet by mouth at bedtime.  Dispense: 90 Tablet; Refill: 3    3. 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 No further improvement expected.     4. Screening for prostate cancer  - PROSTATE SPECIFIC AG SCREENING; Future    Recommend patient follow-up in 1 year in office for an in person visit.    Follow-up: Return in about 1 year (around 7/11/2023) for Annual Wellness Visit.    Face to Face Video Visit:     JEFF Paul

## 2022-07-11 NOTE — ASSESSMENT & PLAN NOTE
History of TBI.  Significant other does assist with his memory recall.  Still struggles with short-term memory.  Very pleasant.  Speaks well.

## 2022-07-11 NOTE — ASSESSMENT & PLAN NOTE
Taking Metformin for this.  No GI side effects.  Denies numbness tingling in his feet.  Due for routine labs. Due for routine eye exam.  He does not have a regular exercise routine however he does walk with his partner occasionally.

## 2022-10-22 DIAGNOSIS — E11.9 CONTROLLED TYPE 2 DIABETES MELLITUS WITHOUT COMPLICATION, WITHOUT LONG-TERM CURRENT USE OF INSULIN (HCC): ICD-10-CM

## 2022-10-22 DIAGNOSIS — E78.5 DYSLIPIDEMIA: ICD-10-CM

## 2022-10-25 RX ORDER — LOSARTAN POTASSIUM 25 MG/1
TABLET ORAL
Qty: 90 TABLET | Refills: 3 | Status: SHIPPED | OUTPATIENT
Start: 2022-10-25 | End: 2023-01-23

## 2022-10-25 RX ORDER — ATORVASTATIN CALCIUM 10 MG/1
TABLET, FILM COATED ORAL
Qty: 90 TABLET | Refills: 3 | Status: SHIPPED | OUTPATIENT
Start: 2022-10-25 | End: 2023-01-23

## 2022-11-08 ENCOUNTER — PATIENT MESSAGE (OUTPATIENT)
Dept: HEALTH INFORMATION MANAGEMENT | Facility: OTHER | Age: 62
End: 2022-11-08

## 2023-01-20 DIAGNOSIS — E78.5 DYSLIPIDEMIA: ICD-10-CM

## 2023-01-20 DIAGNOSIS — E11.9 CONTROLLED TYPE 2 DIABETES MELLITUS WITHOUT COMPLICATION, WITHOUT LONG-TERM CURRENT USE OF INSULIN (HCC): ICD-10-CM

## 2023-01-23 RX ORDER — ATORVASTATIN CALCIUM 10 MG/1
TABLET, FILM COATED ORAL
Qty: 90 TABLET | Refills: 3 | Status: SHIPPED | OUTPATIENT
Start: 2023-01-23 | End: 2023-04-28

## 2023-01-23 RX ORDER — LOSARTAN POTASSIUM 25 MG/1
TABLET ORAL
Qty: 90 TABLET | Refills: 3 | Status: SHIPPED | OUTPATIENT
Start: 2023-01-23 | End: 2023-04-28

## 2023-04-26 DIAGNOSIS — E78.5 DYSLIPIDEMIA: ICD-10-CM

## 2023-04-26 DIAGNOSIS — E11.9 CONTROLLED TYPE 2 DIABETES MELLITUS WITHOUT COMPLICATION, WITHOUT LONG-TERM CURRENT USE OF INSULIN (HCC): ICD-10-CM

## 2023-04-28 RX ORDER — LOSARTAN POTASSIUM 25 MG/1
TABLET ORAL
Qty: 90 TABLET | Refills: 3 | Status: SHIPPED | OUTPATIENT
Start: 2023-04-28 | End: 2023-07-28

## 2023-04-28 RX ORDER — ATORVASTATIN CALCIUM 10 MG/1
TABLET, FILM COATED ORAL
Qty: 90 TABLET | Refills: 3 | Status: SHIPPED | OUTPATIENT
Start: 2023-04-28 | End: 2023-07-28

## 2023-06-26 ENCOUNTER — HOSPITAL ENCOUNTER (OUTPATIENT)
Dept: LAB | Facility: MEDICAL CENTER | Age: 63
End: 2023-06-26
Attending: NURSE PRACTITIONER
Payer: MEDICARE

## 2023-06-26 DIAGNOSIS — Z12.5 SCREENING FOR PROSTATE CANCER: ICD-10-CM

## 2023-06-26 DIAGNOSIS — E78.5 DYSLIPIDEMIA: ICD-10-CM

## 2023-06-26 DIAGNOSIS — E11.9 CONTROLLED TYPE 2 DIABETES MELLITUS WITHOUT COMPLICATION, WITHOUT LONG-TERM CURRENT USE OF INSULIN (HCC): ICD-10-CM

## 2023-06-26 LAB
ALBUMIN SERPL BCP-MCNC: 4.7 G/DL (ref 3.2–4.9)
ALBUMIN/GLOB SERPL: 1.6 G/DL
ALP SERPL-CCNC: 45 U/L (ref 30–99)
ALT SERPL-CCNC: 26 U/L (ref 2–50)
ANION GAP SERPL CALC-SCNC: 12 MMOL/L (ref 7–16)
AST SERPL-CCNC: 19 U/L (ref 12–45)
BILIRUB SERPL-MCNC: 0.7 MG/DL (ref 0.1–1.5)
BUN SERPL-MCNC: 20 MG/DL (ref 8–22)
CALCIUM ALBUM COR SERPL-MCNC: 8.9 MG/DL (ref 8.5–10.5)
CALCIUM SERPL-MCNC: 9.5 MG/DL (ref 8.5–10.5)
CHLORIDE SERPL-SCNC: 104 MMOL/L (ref 96–112)
CHOLEST SERPL-MCNC: 133 MG/DL (ref 100–199)
CO2 SERPL-SCNC: 26 MMOL/L (ref 20–33)
CREAT SERPL-MCNC: 1.03 MG/DL (ref 0.5–1.4)
CREAT UR-MCNC: 150.37 MG/DL
EST. AVERAGE GLUCOSE BLD GHB EST-MCNC: 134 MG/DL
FASTING STATUS PATIENT QL REPORTED: NORMAL
GFR SERPLBLD CREATININE-BSD FMLA CKD-EPI: 81 ML/MIN/1.73 M 2
GLOBULIN SER CALC-MCNC: 3 G/DL (ref 1.9–3.5)
GLUCOSE SERPL-MCNC: 105 MG/DL (ref 65–99)
HBA1C MFR BLD: 6.3 % (ref 4–5.6)
HDLC SERPL-MCNC: 36 MG/DL
LDLC SERPL CALC-MCNC: 81 MG/DL
MICROALBUMIN UR-MCNC: <1.2 MG/DL
MICROALBUMIN/CREAT UR: NORMAL MG/G (ref 0–30)
POTASSIUM SERPL-SCNC: 4.9 MMOL/L (ref 3.6–5.5)
PROT SERPL-MCNC: 7.7 G/DL (ref 6–8.2)
PSA SERPL-MCNC: 0.37 NG/ML (ref 0–4)
SODIUM SERPL-SCNC: 142 MMOL/L (ref 135–145)
TRIGL SERPL-MCNC: 81 MG/DL (ref 0–149)

## 2023-06-26 PROCEDURE — 36415 COLL VENOUS BLD VENIPUNCTURE: CPT | Mod: GA

## 2023-06-26 PROCEDURE — 83036 HEMOGLOBIN GLYCOSYLATED A1C: CPT | Mod: GA

## 2023-06-26 PROCEDURE — 84153 ASSAY OF PSA TOTAL: CPT | Mod: GA

## 2023-06-26 PROCEDURE — 82043 UR ALBUMIN QUANTITATIVE: CPT

## 2023-06-26 PROCEDURE — 80061 LIPID PANEL: CPT

## 2023-06-26 PROCEDURE — 80053 COMPREHEN METABOLIC PANEL: CPT

## 2023-06-26 PROCEDURE — 82570 ASSAY OF URINE CREATININE: CPT

## 2023-07-13 ENCOUNTER — OFFICE VISIT (OUTPATIENT)
Dept: MEDICAL GROUP | Facility: MEDICAL CENTER | Age: 63
End: 2023-07-13
Payer: MEDICARE

## 2023-07-13 VITALS
SYSTOLIC BLOOD PRESSURE: 122 MMHG | BODY MASS INDEX: 26.56 KG/M2 | HEIGHT: 73 IN | WEIGHT: 200.4 LBS | DIASTOLIC BLOOD PRESSURE: 92 MMHG | OXYGEN SATURATION: 90 % | HEART RATE: 95 BPM | TEMPERATURE: 97.1 F

## 2023-07-13 DIAGNOSIS — Z00.00 MEDICARE ANNUAL WELLNESS VISIT, SUBSEQUENT: Primary | ICD-10-CM

## 2023-07-13 DIAGNOSIS — E78.5 DYSLIPIDEMIA: ICD-10-CM

## 2023-07-13 DIAGNOSIS — I10 ESSENTIAL HYPERTENSION: ICD-10-CM

## 2023-07-13 DIAGNOSIS — S06.9X5S TRAUMATIC BRAIN INJURY WITH PROLONGED (MORE THAN 24 HOURS) LOSS OF CONSCIOUSNESS WITH RETURN TO PRE-EXISTING CONSCIOUS LEVEL, SEQUELA (HCC): ICD-10-CM

## 2023-07-13 DIAGNOSIS — E11.9 CONTROLLED TYPE 2 DIABETES MELLITUS WITHOUT COMPLICATION, WITHOUT LONG-TERM CURRENT USE OF INSULIN (HCC): ICD-10-CM

## 2023-07-13 PROCEDURE — 3080F DIAST BP >= 90 MM HG: CPT | Performed by: NURSE PRACTITIONER

## 2023-07-13 PROCEDURE — 3074F SYST BP LT 130 MM HG: CPT | Performed by: NURSE PRACTITIONER

## 2023-07-13 PROCEDURE — G0439 PPPS, SUBSEQ VISIT: HCPCS | Performed by: NURSE PRACTITIONER

## 2023-07-13 SDOH — ECONOMIC STABILITY: TRANSPORTATION INSECURITY
IN THE PAST 12 MONTHS, HAS THE LACK OF TRANSPORTATION KEPT YOU FROM MEDICAL APPOINTMENTS OR FROM GETTING MEDICATIONS?: NO

## 2023-07-13 SDOH — ECONOMIC STABILITY: HOUSING INSECURITY
IN THE LAST 12 MONTHS, WAS THERE A TIME WHEN YOU DID NOT HAVE A STEADY PLACE TO SLEEP OR SLEPT IN A SHELTER (INCLUDING NOW)?: NO

## 2023-07-13 SDOH — ECONOMIC STABILITY: HOUSING INSECURITY: IN THE LAST 12 MONTHS, HOW MANY PLACES HAVE YOU LIVED?: 1

## 2023-07-13 SDOH — HEALTH STABILITY: PHYSICAL HEALTH: ON AVERAGE, HOW MANY DAYS PER WEEK DO YOU ENGAGE IN MODERATE TO STRENUOUS EXERCISE (LIKE A BRISK WALK)?: 5 DAYS

## 2023-07-13 SDOH — HEALTH STABILITY: PHYSICAL HEALTH: ON AVERAGE, HOW MANY MINUTES DO YOU ENGAGE IN EXERCISE AT THIS LEVEL?: 20 MIN

## 2023-07-13 SDOH — ECONOMIC STABILITY: INCOME INSECURITY: IN THE LAST 12 MONTHS, WAS THERE A TIME WHEN YOU WERE NOT ABLE TO PAY THE MORTGAGE OR RENT ON TIME?: NO

## 2023-07-13 SDOH — ECONOMIC STABILITY: FOOD INSECURITY: WITHIN THE PAST 12 MONTHS, THE FOOD YOU BOUGHT JUST DIDN'T LAST AND YOU DIDN'T HAVE MONEY TO GET MORE.: NEVER TRUE

## 2023-07-13 SDOH — ECONOMIC STABILITY: INCOME INSECURITY: HOW HARD IS IT FOR YOU TO PAY FOR THE VERY BASICS LIKE FOOD, HOUSING, MEDICAL CARE, AND HEATING?: NOT HARD AT ALL

## 2023-07-13 SDOH — ECONOMIC STABILITY: FOOD INSECURITY: WITHIN THE PAST 12 MONTHS, YOU WORRIED THAT YOUR FOOD WOULD RUN OUT BEFORE YOU GOT MONEY TO BUY MORE.: NEVER TRUE

## 2023-07-13 SDOH — HEALTH STABILITY: MENTAL HEALTH
STRESS IS WHEN SOMEONE FEELS TENSE, NERVOUS, ANXIOUS, OR CAN'T SLEEP AT NIGHT BECAUSE THEIR MIND IS TROUBLED. HOW STRESSED ARE YOU?: NOT AT ALL

## 2023-07-13 SDOH — ECONOMIC STABILITY: TRANSPORTATION INSECURITY

## 2023-07-13 ASSESSMENT — LIFESTYLE VARIABLES
HOW MANY STANDARD DRINKS CONTAINING ALCOHOL DO YOU HAVE ON A TYPICAL DAY: 1 OR 2
HOW OFTEN DO YOU HAVE A DRINK CONTAINING ALCOHOL: MONTHLY OR LESS
HOW OFTEN DO YOU HAVE SIX OR MORE DRINKS ON ONE OCCASION: NEVER
AUDIT-C TOTAL SCORE: 1
SKIP TO QUESTIONS 9-10: 1

## 2023-07-13 ASSESSMENT — SOCIAL DETERMINANTS OF HEALTH (SDOH)
HOW OFTEN DO YOU HAVE A DRINK CONTAINING ALCOHOL: MONTHLY OR LESS
WITHIN THE PAST 12 MONTHS, YOU WORRIED THAT YOUR FOOD WOULD RUN OUT BEFORE YOU GOT THE MONEY TO BUY MORE: NEVER TRUE
HOW OFTEN DO YOU HAVE SIX OR MORE DRINKS ON ONE OCCASION: NEVER
DO YOU BELONG TO ANY CLUBS OR ORGANIZATIONS SUCH AS CHURCH GROUPS UNIONS, FRATERNAL OR ATHLETIC GROUPS, OR SCHOOL GROUPS?: NO
HOW OFTEN DO YOU ATTEND CHURCH OR RELIGIOUS SERVICES?: NEVER
HOW MANY DRINKS CONTAINING ALCOHOL DO YOU HAVE ON A TYPICAL DAY WHEN YOU ARE DRINKING: 1 OR 2
HOW OFTEN DO YOU ATTENT MEETINGS OF THE CLUB OR ORGANIZATION YOU BELONG TO?: NEVER
HOW OFTEN DO YOU ATTEND CHURCH OR RELIGIOUS SERVICES?: NEVER
HOW OFTEN DO YOU GET TOGETHER WITH FRIENDS OR RELATIVES?: ONCE A WEEK
HOW HARD IS IT FOR YOU TO PAY FOR THE VERY BASICS LIKE FOOD, HOUSING, MEDICAL CARE, AND HEATING?: NOT HARD AT ALL
DO YOU BELONG TO ANY CLUBS OR ORGANIZATIONS SUCH AS CHURCH GROUPS UNIONS, FRATERNAL OR ATHLETIC GROUPS, OR SCHOOL GROUPS?: NO
IN A TYPICAL WEEK, HOW MANY TIMES DO YOU TALK ON THE PHONE WITH FAMILY, FRIENDS, OR NEIGHBORS?: ONCE A WEEK
HOW OFTEN DO YOU ATTENT MEETINGS OF THE CLUB OR ORGANIZATION YOU BELONG TO?: NEVER
HOW OFTEN DO YOU GET TOGETHER WITH FRIENDS OR RELATIVES?: ONCE A WEEK
IN A TYPICAL WEEK, HOW MANY TIMES DO YOU TALK ON THE PHONE WITH FAMILY, FRIENDS, OR NEIGHBORS?: ONCE A WEEK

## 2023-07-13 ASSESSMENT — ENCOUNTER SYMPTOMS: GENERAL WELL-BEING: EXCELLENT

## 2023-07-13 ASSESSMENT — ACTIVITIES OF DAILY LIVING (ADL): BATHING_REQUIRES_ASSISTANCE: 0

## 2023-07-13 ASSESSMENT — PATIENT HEALTH QUESTIONNAIRE - PHQ9: CLINICAL INTERPRETATION OF PHQ2 SCORE: 0

## 2023-07-13 NOTE — LETTER
Request for Medical Records    Patient Name: Mac Yoder    : 1960      Dear Doctor: Destiney Damon O.D.    The above named patient receives primary care at the [facility] by JEFF Paul.  The patient informs us that you are his eye care Provider.    Please fax a copy of the most recent eye exam to [phone] or answer the  questions below and fax this sheet back to us at the above number.  Attached is a signed Release of Information.      Date of last eye exam: _____________    Retinal eye exam summary:        Please select the choice(s) that apply.    ____ No diabetic retinopathy    ____    Diabetic retinopathy present      Printed Name and Credentials: __________________________________    Signature of Eye Care Provider: _________________________________    We appreciate your assistance and collaboration in providing efficient patient care!    Kindest Regards,    [facility] [address] [facility]  [place] [phone]

## 2023-07-13 NOTE — PROGRESS NOTES
Chief Complaint   Patient presents with    Annual Wellness Visit       HPI:  Mac Yoder is a 63 y.o. here for Medicare Annual Wellness Visit     Accompanied by his significant other Roxane de leon.  She assist with HPI as patient does have a history of a TBI.    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      Multiple Vitamins-Minerals (CENTRUM SILVER ADULT 50+ PO) Take  by mouth.      folic acid (FOLVITE) 1 MG Tab Take 1 mg by mouth every day.       No current facility-administered medications for this visit.          Current supplements as per medication list.     Allergies: Patient has no known allergies.    Current social contact/activities: going to concerts, family and friends.     He  reports that he has never smoked. He has never used smokeless tobacco. He reports current alcohol use of about 0.6 oz of alcohol per week. He reports that he does not use drugs.  Counseling given: Not Answered      ROS:    Gait: Uses no assistive device  Ostomy: No  Other tubes: No  Amputations: No  Chronic oxygen use: No  Last eye exam: 7/13/2023  Wears hearing aids: No   : Denies any urinary leakage during the last 6 months    Screening:    Depression Screening  Little interest or pleasure  in doing things?  0 - not at all  Feeling down, depressed , or hopeless? 0 - not at all  Patient Health Questionnaire Score: 0     If depressive symptoms identified deferred to follow up visit unless specifically addressed in assessment and plan.    Interpretation of PHQ-9 Total Score   Score Severity   1-4 No Depression   5-9 Mild Depression   10-14 Moderate Depression   15-19 Moderately Severe Depression   20-27 Severe Depression    Screening for Cognitive Impairment  Three Minute Recall (daughter, heaven, mountain) 2/3    [place]ederico clock face with all 12 numbers and set the hands to show 10 past 11.  Yes    Cognitive concerns identified deferred for follow up unless specifically addressed in assessment and plan.    Fall Risk Assessment  Has the patient had two or more falls in the last year or any fall with injury in the last year?  No    Safety Assessment  Throw rugs on floor.  Yes  Handrails on all stairs.  Yes  Good lighting in all hallways.  Yes  Difficulty hearing.  No  Patient counseled about all safety risks that were identified.    Functional Assessment ADLs  Are there any barriers preventing you from cooking for yourself or meeting nutritional needs?  No.    Are there any barriers preventing you from driving safely or obtaining transportation?  No.    Are there any barriers preventing you from using a telephone or calling for help?  No.    Are there any barriers preventing you from shopping?  No.    Are there any barriers preventing you from taking care of your own finances?  No.    Are there any barriers preventing you from managing your medications?  No.    Are there any barriers preventing you from showering, bathing or dressing yourself?  No.    Are you currently engaging in any exercise or physical activity?  No.     What is your perception of your health?  Excellent    Advance Care Planning  Do you have an Advance Directive, Living Will, Durable Power of , or POLST? Yes      Durable Power of     is not on file - instructed patient to bring in a copy to scan into their chart      Health Maintenance Summary            Overdue - IMM PNEUMOCOCCAL VACCINE: 0-64 Years (1 - PCV) Overdue - never done      No completion history exists for this topic.              Overdue - COVID-19 Vaccine (2 - Booster for Mary series) Overdue since 6/25/2021 04/30/2021  Imm Admin: Mary SARS-CoV-2 Vaccine              Overdue - RETINAL SCREENING (Yearly) Overdue since 12/10/2021      12/10/2020  REFERRAL FOR RETINAL SCREENING EXAM              Postponed - IMM ZOSTER VACCINES (1 of 2) Postponed until 12/13/2023      No completion history exists for this topic.              IMM INFLUENZA (1) Next due on 9/1/2023 09/28/2013  Imm Admin: Influenza Seasonal Injectable - Historical Data              A1C SCREENING (Every 6 Months) Next due on 12/26/2023 06/26/2023  HEMOGLOBIN A1C    05/12/2021  HEMOGLOBIN A1C    05/28/2020  HEMOGLOBIN A1C    02/25/2019  HEMOGLOBIN A1C    08/27/2018  HEMOGLOBIN A1C    Only the first 5 history entries have been loaded, but more history exists.              FASTING LIPID PROFILE (Yearly) Next due on 6/26/2024 06/26/2023  Lipid Profile    05/12/2021  Lipid Profile    05/28/2020  Lipid Profile    08/27/2018  LIPID PROFILE    08/22/2017  LIPID PROFILE    Only the first 5 history entries have been loaded, but more history exists.              URINE ACR / MICROALBUMIN (Yearly) Next due on 6/26/2024 06/26/2023  MICROALBUMIN CREAT RATIO URINE    05/12/2021  MICROALBUMIN CREAT RATIO URINE    05/28/2020  MICROALBUMIN CREAT RATIO URINE    08/27/2018  MICROALBUMIN CREAT RATIO URINE              SERUM CREATININE (Yearly) Next due on 6/26/2024 06/26/2023  Comp Metabolic Panel    05/12/2021  Comp Metabolic Panel    05/28/2020  Comp Metabolic Panel    02/25/2019  COMP METABOLIC PANEL    08/27/2018  COMP METABOLIC PANEL    Only the first 5 history entries have been loaded, but  more history exists.              Annual Wellness Visit (Every 366 Days) Next due on 7/13/2024 07/13/2023  Visit Dx: Medicare annual wellness visit, subsequent    07/13/2023  Level of Service: ANNUAL WELLNESS VISIT-INCLUDES PPPS SUBSEQUE*    07/13/2023  Subsequent Annual Wellness Visit - Includes PPPS ()    07/05/2021  Subsequent Annual Wellness Visit - Includes PPPS ()    06/30/2021  Visit Dx: Medicare annual wellness visit, subsequent    Only the first 5 history entries have been loaded, but more history exists.              DIABETES MONOFILAMENT / LE EXAM (Yearly) Next due on 7/13/2024 07/13/2023  Diabetic Monofilament LE Exam    05/20/2020  Done              COLORECTAL CANCER SCREENING (COLONOSCOPY - Every 10 Years) Next due on 11/5/2025 02/17/2016  OCCULT BLOOD FECES IMMUNOASSAY    11/05/2015  COLONOSCOPY (Patient Declined - Does not want one)    08/13/2015  COLONOSCOPY (Done)              IMM DTaP/Tdap/Td Vaccine (2 - Td or Tdap) Next due on 8/17/2027 08/17/2017  Imm Admin: Tdap Vaccine              HEPATITIS C SCREENING  Completed      05/28/2020  Hepatitis C Antibody component of HEP C VIRUS ANTIBODY              IMM HEP B VACCINE (Series Information) Aged Out      No completion history exists for this topic.              HPV Vaccines (Series Information) Aged Out      No completion history exists for this topic.              IMM MENINGOCOCCAL ACWY VACCINE (Series Information) Aged Out      No completion history exists for this topic.                    Patient Care Team:  JEFF Paul as PCP - General (Nurse Practitioner Family)  Destiney Damon O.D. (Optometry)        Social History     Tobacco Use    Smoking status: Never    Smokeless tobacco: Never   Vaping Use    Vaping Use: Never used   Substance Use Topics    Alcohol use: Yes     Alcohol/week: 0.6 oz     Types: 1 Cans of beer per week     Comment: rare    Drug use: No     Family History   Problem  "Relation Age of Onset    Other Mother     Other Father         Cholesterol     He  has a past medical history of Hyperlipidemia and TBI (traumatic brain injury) (HCC).   History reviewed. No pertinent surgical history.    Exam:   BP (!) 122/92 (BP Location: Right arm, Patient Position: Sitting, BP Cuff Size: Adult)   Pulse 95   Temp 36.2 °C (97.1 °F) (Temporal)   Ht 1.854 m (6' 1\")   Wt 90.9 kg (200 lb 6.4 oz)   SpO2 90%  Body mass index is 26.44 kg/m².    Hearing good.    Dentition good  Alert, oriented in no acute distress.  Eye contact is good, speech goal directed, affect calm    Assessment and Plan. The following treatment and monitoring plan is recommended:      1. Medicare annual wellness visit, subsequent  Have discussed recommended screenings and immunizations.  - Subsequent Annual Wellness Visit - Includes PPPS ()    2. Controlled type 2 diabetes mellitus without complication, without long-term current use of insulin (HCC)  Chronic, stable.  Continue metformin. Consider increasing metformin dose if A1c increases closer to 7.0%.  Patient will try to work on dietary modifications, reducing excessive sweets and carbohydrates.  Monofilament completed.  Up-to-date on retinal exam.  Requesting records.  - Subsequent Annual Wellness Visit - Includes PPPS ()  - Diabetic Monofilament LE Exam    3. 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  Patient continues to struggle with short-term memory.  He can become frustrated at times regarding this.  He is accompanied by his significant other who does assists.   No further improvement expected.  - Subsequent Annual Wellness Visit - Includes PPPS ()    4. Dyslipidemia  Chronic, stable on atorvastatin.  LDL at goal.  Continue atorvastatin.  Annual lipid panel.  - Subsequent Annual Wellness Visit - Includes PPPS ()    5.  Essential " hypertension  Chronic, stable.  Mildly elevated diastolic on today's visit although patient is quite animated today.  Continue losartan 25 mg.  - Subsequent Annual Wellness Visit - Includes PPPS ()    Services suggested: No services needed at this time  Health Care Screening: Age-appropriate preventive services recommended by USPTF and ACIP covered by Medicare were discussed today. Services ordered if indicated and agreed upon by the patient.  Referrals offered: Community-based lifestyle interventions to reduce health risks and promote self-management and wellness, fall prevention, nutrition, physical activity, tobacco-use cessation, weight loss, and mental health services as per orders if indicated.    Discussion today about general wellness and lifestyle habits:    Prevent falls and reduce trip hazards; Cautioned about securing or removing rugs.  Have a working fire alarm and carbon monoxide detector;   Engage in regular physical activity and social activities     Follow-up: Return for Annual Preventative Exam.

## 2023-07-13 NOTE — LETTER
[facility]  SHANNON PaulP.R.N.  [address] [facility] [address]  [place] [address]  Fax: [phone]   Authorization for Release/Disclosure of   Protected Health Information   Name: MAC CANTOR : 1960 SSN: [id number]   Address: [address] [place] [address]  [place] [address] Phone:    [phone] (home)    I authorize the entity listed below to release/disclose the PHI below to:   [facility]/Ana Calvillo A.P.R.VAN and SHANNON PaulP.RSUZAN   Provider or Entity Name:  Dr. FREDRICK Ferreira   Address   City, State, Zip   Phone:      Fax:  [phone]   Reason for request: continuity of care   Information to be released:    [  ] LAST COLONOSCOPY,  including any PATH REPORT and follow-up  [  ] LAST FIT/COLOGUARD RESULT [  ] LAST DEXA  [  ] LAST MAMMOGRAM  [  ] LAST PAP  [  ] LAST LABS [ x ] RETINA EXAM REPORT  [  ] IMMUNIZATION RECORDS  [  ] Release all info      [  ] Check here and initial the line next to each item to release ALL health information INCLUDING  _____ Care and treatment for drug and / or alcohol abuse  _____ HIV testing, infection status, or AIDS  _____ Genetic Testing    DATES OF SERVICE OR TIME PERIOD TO BE DISCLOSED: _____________  I understand and acknowledge that:  * This Authorization may be revoked at any time by you in writing, except if your health information has already been used or disclosed.  * Your health information that will be used or disclosed as a result of you signing this authorization could be re-disclosed by the recipient. If this occurs, your re-disclosed health information may no longer be protected by State or Federal laws.  * You may refuse to sign this Authorization. Your refusal will not affect your ability to obtain treatment.  * This Authorization becomes effective upon signing and will  on (date) __________.      If no date is indicated, this Authorization will  one (1) year from the signature date.    Name: Mac RONDON  Paresh  Signature: Date:   7/13/2023     PLEASE FAX REQUESTED RECORDS BACK TO: [phone]

## 2023-07-13 NOTE — LETTER
Request for Medical Records    Patient Name: Mac Yoder    : 1960      Dear Doctor: FREDRICK Ferreira    The above named patient receives primary care at the [facility] by JEFF Paul.  The patient informs us that you are his eye care Provider.    Please fax a copy of the most recent eye exam to [phone] or answer the  questions below and fax this sheet back to us at the above number.  Attached is a signed Release of Information.      Date of last eye exam: _____________    Retinal eye exam summary:        Please select the choice(s) that apply.    ____ No diabetic retinopathy    ____    Diabetic retinopathy present      Printed Name and Credentials: __________________________________    Signature of Eye Care Provider: _________________________________    We appreciate your assistance and collaboration in providing efficient patient care!    Kindest Regards,    [facility] [address] [facility]  [place] [phone]

## 2023-07-26 DIAGNOSIS — E11.9 CONTROLLED TYPE 2 DIABETES MELLITUS WITHOUT COMPLICATION, WITHOUT LONG-TERM CURRENT USE OF INSULIN (HCC): ICD-10-CM

## 2023-07-26 DIAGNOSIS — E78.5 DYSLIPIDEMIA: ICD-10-CM

## 2023-07-28 RX ORDER — LOSARTAN POTASSIUM 25 MG/1
TABLET ORAL
Qty: 90 TABLET | Refills: 3 | Status: SHIPPED | OUTPATIENT
Start: 2023-07-28 | End: 2023-11-08

## 2023-07-28 RX ORDER — ATORVASTATIN CALCIUM 10 MG/1
TABLET, FILM COATED ORAL
Qty: 90 TABLET | Refills: 3 | Status: SHIPPED | OUTPATIENT
Start: 2023-07-28 | End: 2023-11-08

## 2023-11-06 DIAGNOSIS — E78.5 DYSLIPIDEMIA: ICD-10-CM

## 2023-11-06 DIAGNOSIS — E11.9 CONTROLLED TYPE 2 DIABETES MELLITUS WITHOUT COMPLICATION, WITHOUT LONG-TERM CURRENT USE OF INSULIN (HCC): ICD-10-CM

## 2023-11-08 RX ORDER — LOSARTAN POTASSIUM 25 MG/1
TABLET ORAL
Qty: 90 TABLET | Refills: 3 | Status: SHIPPED | OUTPATIENT
Start: 2023-11-08

## 2023-11-08 RX ORDER — ATORVASTATIN CALCIUM 10 MG/1
TABLET, FILM COATED ORAL
Qty: 90 TABLET | Refills: 3 | Status: SHIPPED | OUTPATIENT
Start: 2023-11-08

## 2024-07-12 SDOH — HEALTH STABILITY: PHYSICAL HEALTH
ON AVERAGE, HOW MANY DAYS PER WEEK DO YOU ENGAGE IN MODERATE TO STRENUOUS EXERCISE (LIKE A BRISK WALK)?: PATIENT DECLINED

## 2024-07-12 SDOH — HEALTH STABILITY: PHYSICAL HEALTH: ON AVERAGE, HOW MANY MINUTES DO YOU ENGAGE IN EXERCISE AT THIS LEVEL?: PATIENT DECLINED

## 2024-07-12 SDOH — ECONOMIC STABILITY: TRANSPORTATION INSECURITY
IN THE PAST 12 MONTHS, HAS THE LACK OF TRANSPORTATION KEPT YOU FROM MEDICAL APPOINTMENTS OR FROM GETTING MEDICATIONS?: PATIENT DECLINED

## 2024-07-12 SDOH — ECONOMIC STABILITY: INCOME INSECURITY: IN THE LAST 12 MONTHS, WAS THERE A TIME WHEN YOU WERE NOT ABLE TO PAY THE MORTGAGE OR RENT ON TIME?: NO

## 2024-07-12 SDOH — ECONOMIC STABILITY: FOOD INSECURITY: WITHIN THE PAST 12 MONTHS, THE FOOD YOU BOUGHT JUST DIDN'T LAST AND YOU DIDN'T HAVE MONEY TO GET MORE.: NEVER TRUE

## 2024-07-12 SDOH — ECONOMIC STABILITY: FOOD INSECURITY: WITHIN THE PAST 12 MONTHS, YOU WORRIED THAT YOUR FOOD WOULD RUN OUT BEFORE YOU GOT MONEY TO BUY MORE.: NEVER TRUE

## 2024-07-12 SDOH — ECONOMIC STABILITY: HOUSING INSECURITY: IN THE LAST 12 MONTHS, HOW MANY PLACES HAVE YOU LIVED?: 1

## 2024-07-12 SDOH — ECONOMIC STABILITY: INCOME INSECURITY: HOW HARD IS IT FOR YOU TO PAY FOR THE VERY BASICS LIKE FOOD, HOUSING, MEDICAL CARE, AND HEATING?: PATIENT DECLINED

## 2024-07-12 SDOH — HEALTH STABILITY: MENTAL HEALTH
STRESS IS WHEN SOMEONE FEELS TENSE, NERVOUS, ANXIOUS, OR CAN'T SLEEP AT NIGHT BECAUSE THEIR MIND IS TROUBLED. HOW STRESSED ARE YOU?: PATIENT DECLINED

## 2024-07-12 SDOH — ECONOMIC STABILITY: HOUSING INSECURITY
IN THE LAST 12 MONTHS, WAS THERE A TIME WHEN YOU DID NOT HAVE A STEADY PLACE TO SLEEP OR SLEPT IN A SHELTER (INCLUDING NOW)?: PATIENT DECLINED

## 2024-07-12 SDOH — ECONOMIC STABILITY: TRANSPORTATION INSECURITY
IN THE PAST 12 MONTHS, HAS LACK OF RELIABLE TRANSPORTATION KEPT YOU FROM MEDICAL APPOINTMENTS, MEETINGS, WORK OR FROM GETTING THINGS NEEDED FOR DAILY LIVING?: PATIENT DECLINED

## 2024-07-12 SDOH — ECONOMIC STABILITY: TRANSPORTATION INSECURITY
IN THE PAST 12 MONTHS, HAS LACK OF TRANSPORTATION KEPT YOU FROM MEETINGS, WORK, OR FROM GETTING THINGS NEEDED FOR DAILY LIVING?: PATIENT DECLINED

## 2024-07-12 ASSESSMENT — LIFESTYLE VARIABLES
HOW OFTEN DO YOU HAVE A DRINK CONTAINING ALCOHOL: PATIENT DECLINED
HOW MANY STANDARD DRINKS CONTAINING ALCOHOL DO YOU HAVE ON A TYPICAL DAY: PATIENT DECLINED
HOW OFTEN DO YOU HAVE A DRINK CONTAINING ALCOHOL: PATIENT DECLINED
SKIP TO QUESTIONS 9-10: 0
SKIP TO QUESTIONS 9-10: 0
HOW OFTEN DO YOU HAVE SIX OR MORE DRINKS ON ONE OCCASION: PATIENT DECLINED
HOW OFTEN DO YOU HAVE SIX OR MORE DRINKS ON ONE OCCASION: PATIENT DECLINED
HOW MANY STANDARD DRINKS CONTAINING ALCOHOL DO YOU HAVE ON A TYPICAL DAY: PATIENT DECLINED
AUDIT-C TOTAL SCORE: -1
AUDIT-C TOTAL SCORE: -1

## 2024-07-12 ASSESSMENT — SOCIAL DETERMINANTS OF HEALTH (SDOH)
HOW OFTEN DO YOU ATTEND CHURCH OR RELIGIOUS SERVICES?: PATIENT DECLINED
HOW OFTEN DO YOU ATTENT MEETINGS OF THE CLUB OR ORGANIZATION YOU BELONG TO?: PATIENT DECLINED
DO YOU BELONG TO ANY CLUBS OR ORGANIZATIONS SUCH AS CHURCH GROUPS UNIONS, FRATERNAL OR ATHLETIC GROUPS, OR SCHOOL GROUPS?: PATIENT DECLINED
ARE YOU MARRIED, WIDOWED, DIVORCED, SEPARATED, NEVER MARRIED, OR LIVING WITH A PARTNER?: PATIENT DECLINED
HOW HARD IS IT FOR YOU TO PAY FOR THE VERY BASICS LIKE FOOD, HOUSING, MEDICAL CARE, AND HEATING?: PATIENT DECLINED
HOW OFTEN DO YOU GET TOGETHER WITH FRIENDS OR RELATIVES?: PATIENT DECLINED
ARE YOU MARRIED, WIDOWED, DIVORCED, SEPARATED, NEVER MARRIED, OR LIVING WITH A PARTNER?: PATIENT DECLINED
HOW OFTEN DO YOU HAVE SIX OR MORE DRINKS ON ONE OCCASION: PATIENT DECLINED
HOW OFTEN DO YOU ATTENT MEETINGS OF THE CLUB OR ORGANIZATION YOU BELONG TO?: PATIENT DECLINED
IN THE PAST 12 MONTHS, HAS THE ELECTRIC, GAS, OIL, OR WATER COMPANY THREATENED TO SHUT OFF SERVICE IN YOUR HOME?: PATIENT DECLINED
IN A TYPICAL WEEK, HOW MANY TIMES DO YOU TALK ON THE PHONE WITH FAMILY, FRIENDS, OR NEIGHBORS?: PATIENT DECLINED
HOW OFTEN DO YOU HAVE A DRINK CONTAINING ALCOHOL: PATIENT DECLINED
HOW OFTEN DO YOU GET TOGETHER WITH FRIENDS OR RELATIVES?: PATIENT DECLINED
IN THE PAST 12 MONTHS, HAS THE ELECTRIC, GAS, OIL, OR WATER COMPANY THREATENED TO SHUT OFF SERVICE IN YOUR HOME?: PATIENT DECLINED
HOW OFTEN DO YOU ATTEND CHURCH OR RELIGIOUS SERVICES?: PATIENT DECLINED
HOW OFTEN DO YOU ATTEND CHURCH OR RELIGIOUS SERVICES?: PATIENT DECLINED
HOW OFTEN DO YOU ATTENT MEETINGS OF THE CLUB OR ORGANIZATION YOU BELONG TO?: PATIENT DECLINED
HOW OFTEN DO YOU GET TOGETHER WITH FRIENDS OR RELATIVES?: PATIENT DECLINED
DO YOU BELONG TO ANY CLUBS OR ORGANIZATIONS SUCH AS CHURCH GROUPS UNIONS, FRATERNAL OR ATHLETIC GROUPS, OR SCHOOL GROUPS?: PATIENT DECLINED
DO YOU BELONG TO ANY CLUBS OR ORGANIZATIONS SUCH AS CHURCH GROUPS UNIONS, FRATERNAL OR ATHLETIC GROUPS, OR SCHOOL GROUPS?: PATIENT DECLINED
HOW MANY DRINKS CONTAINING ALCOHOL DO YOU HAVE ON A TYPICAL DAY WHEN YOU ARE DRINKING: PATIENT DECLINED
IN A TYPICAL WEEK, HOW MANY TIMES DO YOU TALK ON THE PHONE WITH FAMILY, FRIENDS, OR NEIGHBORS?: PATIENT DECLINED
ARE YOU MARRIED, WIDOWED, DIVORCED, SEPARATED, NEVER MARRIED, OR LIVING WITH A PARTNER?: PATIENT DECLINED
WITHIN THE PAST 12 MONTHS, YOU WORRIED THAT YOUR FOOD WOULD RUN OUT BEFORE YOU GOT THE MONEY TO BUY MORE: NEVER TRUE
IN A TYPICAL WEEK, HOW MANY TIMES DO YOU TALK ON THE PHONE WITH FAMILY, FRIENDS, OR NEIGHBORS?: PATIENT DECLINED

## 2024-07-15 ENCOUNTER — APPOINTMENT (OUTPATIENT)
Dept: MEDICAL GROUP | Facility: MEDICAL CENTER | Age: 64
End: 2024-07-15
Payer: MEDICARE

## 2024-07-15 SDOH — ECONOMIC STABILITY: HOUSING INSECURITY: IN THE LAST 12 MONTHS, HOW MANY PLACES HAVE YOU LIVED?: 1

## 2024-07-15 SDOH — HEALTH STABILITY: PHYSICAL HEALTH: ON AVERAGE, HOW MANY MINUTES DO YOU ENGAGE IN EXERCISE AT THIS LEVEL?: PATIENT DECLINED

## 2024-07-15 ASSESSMENT — SOCIAL DETERMINANTS OF HEALTH (SDOH)
HOW HARD IS IT FOR YOU TO PAY FOR THE VERY BASICS LIKE FOOD, HOUSING, MEDICAL CARE, AND HEATING?: PATIENT DECLINED
HOW OFTEN DO YOU GET TOGETHER WITH FRIENDS OR RELATIVES?: PATIENT DECLINED
HOW MANY DRINKS CONTAINING ALCOHOL DO YOU HAVE ON A TYPICAL DAY WHEN YOU ARE DRINKING: PATIENT DECLINED
WITHIN THE PAST 12 MONTHS, YOU WORRIED THAT YOUR FOOD WOULD RUN OUT BEFORE YOU GOT THE MONEY TO BUY MORE: NEVER TRUE
HOW OFTEN DO YOU HAVE A DRINK CONTAINING ALCOHOL: PATIENT DECLINED
HOW OFTEN DO YOU ATTEND CHURCH OR RELIGIOUS SERVICES?: PATIENT DECLINED
ARE YOU MARRIED, WIDOWED, DIVORCED, SEPARATED, NEVER MARRIED, OR LIVING WITH A PARTNER?: PATIENT DECLINED
IN A TYPICAL WEEK, HOW MANY TIMES DO YOU TALK ON THE PHONE WITH FAMILY, FRIENDS, OR NEIGHBORS?: PATIENT DECLINED
DO YOU BELONG TO ANY CLUBS OR ORGANIZATIONS SUCH AS CHURCH GROUPS UNIONS, FRATERNAL OR ATHLETIC GROUPS, OR SCHOOL GROUPS?: PATIENT DECLINED
HOW OFTEN DO YOU ATTENT MEETINGS OF THE CLUB OR ORGANIZATION YOU BELONG TO?: PATIENT DECLINED
HOW OFTEN DO YOU HAVE SIX OR MORE DRINKS ON ONE OCCASION: PATIENT DECLINED

## 2024-08-17 SDOH — ECONOMIC STABILITY: FOOD INSECURITY: WITHIN THE PAST 12 MONTHS, YOU WORRIED THAT YOUR FOOD WOULD RUN OUT BEFORE YOU GOT MONEY TO BUY MORE.: NEVER TRUE

## 2024-08-17 SDOH — ECONOMIC STABILITY: INCOME INSECURITY: IN THE LAST 12 MONTHS, WAS THERE A TIME WHEN YOU WERE NOT ABLE TO PAY THE MORTGAGE OR RENT ON TIME?: NO

## 2024-08-17 SDOH — ECONOMIC STABILITY: FOOD INSECURITY: WITHIN THE PAST 12 MONTHS, THE FOOD YOU BOUGHT JUST DIDN'T LAST AND YOU DIDN'T HAVE MONEY TO GET MORE.: NEVER TRUE

## 2024-08-17 SDOH — ECONOMIC STABILITY: INCOME INSECURITY: HOW HARD IS IT FOR YOU TO PAY FOR THE VERY BASICS LIKE FOOD, HOUSING, MEDICAL CARE, AND HEATING?: NOT HARD AT ALL

## 2024-08-17 ASSESSMENT — SOCIAL DETERMINANTS OF HEALTH (SDOH)
DO YOU BELONG TO ANY CLUBS OR ORGANIZATIONS SUCH AS CHURCH GROUPS UNIONS, FRATERNAL OR ATHLETIC GROUPS, OR SCHOOL GROUPS?: NO
ARE YOU MARRIED, WIDOWED, DIVORCED, SEPARATED, NEVER MARRIED, OR LIVING WITH A PARTNER?: LIVING WITH PARTNER
HOW OFTEN DO YOU ATTENT MEETINGS OF THE CLUB OR ORGANIZATION YOU BELONG TO?: NEVER
HOW OFTEN DO YOU GET TOGETHER WITH FRIENDS OR RELATIVES?: ONCE A WEEK
IN THE PAST 12 MONTHS, HAS THE ELECTRIC, GAS, OIL, OR WATER COMPANY THREATENED TO SHUT OFF SERVICE IN YOUR HOME?: NO
HOW OFTEN DO YOU ATTEND CHURCH OR RELIGIOUS SERVICES?: NEVER
IN A TYPICAL WEEK, HOW MANY TIMES DO YOU TALK ON THE PHONE WITH FAMILY, FRIENDS, OR NEIGHBORS?: ONCE A WEEK

## 2024-08-17 ASSESSMENT — LIFESTYLE VARIABLES
AUDIT-C TOTAL SCORE: 1
HOW OFTEN DO YOU HAVE SIX OR MORE DRINKS ON ONE OCCASION: NEVER
SKIP TO QUESTIONS 9-10: 1
HOW MANY STANDARD DRINKS CONTAINING ALCOHOL DO YOU HAVE ON A TYPICAL DAY: 1 OR 2
HOW OFTEN DO YOU HAVE A DRINK CONTAINING ALCOHOL: MONTHLY OR LESS

## 2024-08-19 SDOH — HEALTH STABILITY: PHYSICAL HEALTH

## 2024-08-19 ASSESSMENT — SOCIAL DETERMINANTS OF HEALTH (SDOH)
HOW OFTEN DO YOU ATTEND CHURCH OR RELIGIOUS SERVICES?: NEVER
IN A TYPICAL WEEK, HOW MANY TIMES DO YOU TALK ON THE PHONE WITH FAMILY, FRIENDS, OR NEIGHBORS?: ONCE A WEEK
DO YOU BELONG TO ANY CLUBS OR ORGANIZATIONS SUCH AS CHURCH GROUPS UNIONS, FRATERNAL OR ATHLETIC GROUPS, OR SCHOOL GROUPS?: NO
HOW OFTEN DO YOU ATTENT MEETINGS OF THE CLUB OR ORGANIZATION YOU BELONG TO?: NEVER
HOW MANY DRINKS CONTAINING ALCOHOL DO YOU HAVE ON A TYPICAL DAY WHEN YOU ARE DRINKING: 1 OR 2
WITHIN THE PAST 12 MONTHS, YOU WORRIED THAT YOUR FOOD WOULD RUN OUT BEFORE YOU GOT THE MONEY TO BUY MORE: NEVER TRUE
ARE YOU MARRIED, WIDOWED, DIVORCED, SEPARATED, NEVER MARRIED, OR LIVING WITH A PARTNER?: LIVING WITH PARTNER
HOW HARD IS IT FOR YOU TO PAY FOR THE VERY BASICS LIKE FOOD, HOUSING, MEDICAL CARE, AND HEATING?: NOT HARD AT ALL
HOW OFTEN DO YOU HAVE A DRINK CONTAINING ALCOHOL: MONTHLY OR LESS
HOW OFTEN DO YOU GET TOGETHER WITH FRIENDS OR RELATIVES?: ONCE A WEEK
HOW OFTEN DO YOU HAVE SIX OR MORE DRINKS ON ONE OCCASION: NEVER

## 2024-08-20 ENCOUNTER — APPOINTMENT (OUTPATIENT)
Dept: MEDICAL GROUP | Facility: MEDICAL CENTER | Age: 64
End: 2024-08-20
Payer: MEDICARE

## 2024-09-02 SDOH — ECONOMIC STABILITY: FOOD INSECURITY: WITHIN THE PAST 12 MONTHS, THE FOOD YOU BOUGHT JUST DIDN'T LAST AND YOU DIDN'T HAVE MONEY TO GET MORE.: NEVER TRUE

## 2024-09-02 SDOH — HEALTH STABILITY: PHYSICAL HEALTH

## 2024-09-02 SDOH — ECONOMIC STABILITY: FOOD INSECURITY: WITHIN THE PAST 12 MONTHS, YOU WORRIED THAT YOUR FOOD WOULD RUN OUT BEFORE YOU GOT MONEY TO BUY MORE.: NEVER TRUE

## 2024-09-02 SDOH — ECONOMIC STABILITY: INCOME INSECURITY: IN THE LAST 12 MONTHS, WAS THERE A TIME WHEN YOU WERE NOT ABLE TO PAY THE MORTGAGE OR RENT ON TIME?: NO

## 2024-09-02 SDOH — ECONOMIC STABILITY: INCOME INSECURITY: HOW HARD IS IT FOR YOU TO PAY FOR THE VERY BASICS LIKE FOOD, HOUSING, MEDICAL CARE, AND HEATING?: NOT HARD AT ALL

## 2024-09-02 ASSESSMENT — SOCIAL DETERMINANTS OF HEALTH (SDOH)
HOW OFTEN DO YOU GET TOGETHER WITH FRIENDS OR RELATIVES?: ONCE A WEEK
ARE YOU MARRIED, WIDOWED, DIVORCED, SEPARATED, NEVER MARRIED, OR LIVING WITH A PARTNER?: LIVING WITH PARTNER
DO YOU BELONG TO ANY CLUBS OR ORGANIZATIONS SUCH AS CHURCH GROUPS UNIONS, FRATERNAL OR ATHLETIC GROUPS, OR SCHOOL GROUPS?: NO
IN THE PAST 12 MONTHS, HAS THE ELECTRIC, GAS, OIL, OR WATER COMPANY THREATENED TO SHUT OFF SERVICE IN YOUR HOME?: NO
HOW OFTEN DO YOU HAVE A DRINK CONTAINING ALCOHOL: MONTHLY OR LESS
HOW OFTEN DO YOU HAVE SIX OR MORE DRINKS ON ONE OCCASION: NEVER
HOW MANY DRINKS CONTAINING ALCOHOL DO YOU HAVE ON A TYPICAL DAY WHEN YOU ARE DRINKING: 1 OR 2
HOW OFTEN DO YOU ATTEND CHURCH OR RELIGIOUS SERVICES?: NEVER
IN A TYPICAL WEEK, HOW MANY TIMES DO YOU TALK ON THE PHONE WITH FAMILY, FRIENDS, OR NEIGHBORS?: ONCE A WEEK
HOW OFTEN DO YOU GET TOGETHER WITH FRIENDS OR RELATIVES?: ONCE A WEEK
IN A TYPICAL WEEK, HOW MANY TIMES DO YOU TALK ON THE PHONE WITH FAMILY, FRIENDS, OR NEIGHBORS?: ONCE A WEEK
HOW OFTEN DO YOU ATTEND CHURCH OR RELIGIOUS SERVICES?: NEVER
ARE YOU MARRIED, WIDOWED, DIVORCED, SEPARATED, NEVER MARRIED, OR LIVING WITH A PARTNER?: LIVING WITH PARTNER
WITHIN THE PAST 12 MONTHS, YOU WORRIED THAT YOUR FOOD WOULD RUN OUT BEFORE YOU GOT THE MONEY TO BUY MORE: NEVER TRUE
HOW OFTEN DO YOU ATTENT MEETINGS OF THE CLUB OR ORGANIZATION YOU BELONG TO?: NEVER
HOW HARD IS IT FOR YOU TO PAY FOR THE VERY BASICS LIKE FOOD, HOUSING, MEDICAL CARE, AND HEATING?: NOT HARD AT ALL
HOW OFTEN DO YOU ATTENT MEETINGS OF THE CLUB OR ORGANIZATION YOU BELONG TO?: NEVER
DO YOU BELONG TO ANY CLUBS OR ORGANIZATIONS SUCH AS CHURCH GROUPS UNIONS, FRATERNAL OR ATHLETIC GROUPS, OR SCHOOL GROUPS?: NO

## 2024-09-02 ASSESSMENT — LIFESTYLE VARIABLES
AUDIT-C TOTAL SCORE: 1
SKIP TO QUESTIONS 9-10: 1
HOW OFTEN DO YOU HAVE SIX OR MORE DRINKS ON ONE OCCASION: NEVER
HOW OFTEN DO YOU HAVE A DRINK CONTAINING ALCOHOL: MONTHLY OR LESS
HOW MANY STANDARD DRINKS CONTAINING ALCOHOL DO YOU HAVE ON A TYPICAL DAY: 1 OR 2

## 2024-09-05 ENCOUNTER — APPOINTMENT (OUTPATIENT)
Dept: MEDICAL GROUP | Facility: MEDICAL CENTER | Age: 64
End: 2024-09-05
Payer: MEDICARE

## 2024-09-05 VITALS
BODY MASS INDEX: 25.84 KG/M2 | HEIGHT: 73 IN | HEART RATE: 64 BPM | WEIGHT: 195 LBS | OXYGEN SATURATION: 95 % | TEMPERATURE: 97.6 F | SYSTOLIC BLOOD PRESSURE: 124 MMHG | RESPIRATION RATE: 16 BRPM | DIASTOLIC BLOOD PRESSURE: 74 MMHG

## 2024-09-05 DIAGNOSIS — S06.9X5S TRAUMATIC BRAIN INJURY WITH PROLONGED (MORE THAN 24 HOURS) LOSS OF CONSCIOUSNESS WITH RETURN TO PRE-EXISTING CONSCIOUS LEVEL, SEQUELA (HCC): Chronic | ICD-10-CM

## 2024-09-05 DIAGNOSIS — E78.5 DYSLIPIDEMIA: ICD-10-CM

## 2024-09-05 DIAGNOSIS — Z12.5 SCREENING FOR MALIGNANT NEOPLASM OF PROSTATE: ICD-10-CM

## 2024-09-05 DIAGNOSIS — E11.9 CONTROLLED TYPE 2 DIABETES MELLITUS WITHOUT COMPLICATION, WITHOUT LONG-TERM CURRENT USE OF INSULIN (HCC): ICD-10-CM

## 2024-09-05 LAB
HBA1C MFR BLD: 5.8 % (ref ?–5.8)
POCT INT CON NEG: NEGATIVE
POCT INT CON POS: POSITIVE

## 2024-09-05 PROCEDURE — 3074F SYST BP LT 130 MM HG: CPT | Performed by: NURSE PRACTITIONER

## 2024-09-05 PROCEDURE — 3078F DIAST BP <80 MM HG: CPT | Performed by: NURSE PRACTITIONER

## 2024-09-05 PROCEDURE — 83036 HEMOGLOBIN GLYCOSYLATED A1C: CPT | Performed by: NURSE PRACTITIONER

## 2024-09-05 PROCEDURE — 99214 OFFICE O/P EST MOD 30 MIN: CPT | Performed by: NURSE PRACTITIONER

## 2024-09-05 RX ORDER — LOSARTAN POTASSIUM 25 MG/1
25 TABLET ORAL
Qty: 90 TABLET | Refills: 3 | Status: SHIPPED | OUTPATIENT
Start: 2024-09-05

## 2024-09-05 RX ORDER — ATORVASTATIN CALCIUM 10 MG/1
10 TABLET, FILM COATED ORAL
Qty: 90 TABLET | Refills: 3 | Status: SHIPPED | OUTPATIENT
Start: 2024-09-05

## 2024-09-05 ASSESSMENT — PATIENT HEALTH QUESTIONNAIRE - PHQ9: CLINICAL INTERPRETATION OF PHQ2 SCORE: 0

## 2024-09-05 NOTE — PROGRESS NOTES
Subjective:     HPI:     Mac Yoder is a 64 y.o. male presents to discuss:   Chief Complaint   Patient presents with    Annual Exam     Accompanied by his significant other Roxane today.  She assist with HPI as patient does have a history of a TBI.     Controlled type 2 diabetes mellitus without complication, without long-term current use of insulin (HCC)  Taking Metformin for this.  No GI side effects.    Lab Results   Component Value Date/Time    HBA1C 5.8 09/05/2024 1548    HBA1C 6.3 (H) 06/26/2023 0712    HBA1C 5.9 (H) 05/12/2021 0726    HBA1C 6.3 (H) 05/28/2020 0650   Due for Monofilament.   Denies numbness tingling in his feet.   Due for routine labs. Due for routine eye exam.  He does not have a regular exercise routine however he does walk with his partner occasionally.     Dyslipidemia  Currently taking atorvastatin 10 mg for this.  Due for routine labs and refills. Denies Myalgias.      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  Significant other does assist with his memory recall.  Still struggles with short-term memory.  Very pleasant.  Speaks well.  Very jovial attitude.     ROS: : see above      Current Outpatient Medications:     atorvastatin (LIPITOR) 10 MG Tab, Take 1 Tablet by mouth at bedtime., Disp: 90 Tablet, Rfl: 3    metFORMIN (GLUCOPHAGE) 500 MG Tab, Take 1 Tablet by mouth every day., Disp: 90 Tablet, Rfl: 3    losartan (COZAAR) 25 MG Tab, Take 1 Tablet by mouth every day., Disp: 90 Tablet, Rfl: 3    aspirin EC (ECOTRIN) 81 MG Tablet Delayed Response, Take 81 mg by mouth every day., Disp: , Rfl:     Multiple Vitamins-Minerals (CENTRUM SILVER ADULT 50+ PO), Take  by mouth., Disp: , Rfl:     folic acid (FOLVITE) 1 MG Tab, Take 1 mg by mouth every day., Disp: , Rfl:     No Known Allergies    Objective:     Vitals: /74   Pulse 64   Temp 36.4 °C (97.6 °F)   Resp 16   Ht 1.854 m  "(6' 1\")   Wt 88.5 kg (195 lb)   SpO2 95%   BMI 25.73 kg/m²      General: Alert, pleasant, NAD  HEENT: Normocephalic.  Neck supple.   Respiratory: no distress, no audible wheezing, RR -WNL  Heart: HRR> no murmurs  Skin: Warm, dry, no rashes.  Extremities: No leg edema. No discoloration  Neurological: No tremors  Psych:  Affect/mood is normal, judgement is good, memory is intact, grooming is appropriate.    Assessment/Plan:      1. Controlled type 2 diabetes mellitus without complication, without long-term current use of insulin (HCC)  Controlled with Metformin.   Monofilament completed.   Due for routine labs.   Due for routine eye exam-pt to schedule.  - POCT Hemoglobin A1C  - metFORMIN (GLUCOPHAGE) 500 MG Tab; Take 1 Tablet by mouth every day.  Dispense: 90 Tablet; Refill: 3  - losartan (COZAAR) 25 MG Tab; Take 1 Tablet by mouth every day.  Dispense: 90 Tablet; Refill: 3  - Lipid Profile; Future  - Comp Metabolic Panel; Future  - HEMOGLOBIN A1C; Future  - MICROALBUMIN CREAT RATIO URINE; Future  - Diabetic Monofilament LE Exam    2. Dyslipidemia  On statin, denies myalgias.   Due for update labs.   Continue Atorvastatin 10mg. Refills provided.   - atorvastatin (LIPITOR) 10 MG Tab; Take 1 Tablet by mouth at bedtime.  Dispense: 90 Tablet; Refill: 3  - Lipid Profile; Future  - Comp Metabolic Panel; Future    3. 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 No further improvement expected.   Continue to stay active.     4. Screening for malignant neoplasm of prostate  - PROSTATE SPECIFIC AG SCREENING; Future     Return in about 1 year (around 9/5/2025).    {I have placed the above orders and discussed them with an approved delegating provider. The MA is performing the below orders under the direction of Dr. Jerel RING.    "

## 2024-09-13 ENCOUNTER — HOSPITAL ENCOUNTER (OUTPATIENT)
Dept: LAB | Facility: MEDICAL CENTER | Age: 64
End: 2024-09-13
Attending: NURSE PRACTITIONER
Payer: MEDICARE

## 2024-09-13 DIAGNOSIS — E11.9 CONTROLLED TYPE 2 DIABETES MELLITUS WITHOUT COMPLICATION, WITHOUT LONG-TERM CURRENT USE OF INSULIN (HCC): ICD-10-CM

## 2024-09-13 DIAGNOSIS — Z12.5 SCREENING FOR MALIGNANT NEOPLASM OF PROSTATE: ICD-10-CM

## 2024-09-13 DIAGNOSIS — E78.5 DYSLIPIDEMIA: ICD-10-CM

## 2024-09-13 LAB
EST. AVERAGE GLUCOSE BLD GHB EST-MCNC: 140 MG/DL
HBA1C MFR BLD: 6.5 % (ref 4–5.6)
PSA SERPL-MCNC: 0.41 NG/ML (ref 0–4)

## 2024-09-13 PROCEDURE — 82043 UR ALBUMIN QUANTITATIVE: CPT

## 2024-09-13 PROCEDURE — 80061 LIPID PANEL: CPT

## 2024-09-13 PROCEDURE — 83036 HEMOGLOBIN GLYCOSYLATED A1C: CPT | Mod: GA

## 2024-09-13 PROCEDURE — 36415 COLL VENOUS BLD VENIPUNCTURE: CPT | Mod: GA

## 2024-09-13 PROCEDURE — 80053 COMPREHEN METABOLIC PANEL: CPT

## 2024-09-13 PROCEDURE — 84153 ASSAY OF PSA TOTAL: CPT | Mod: GA

## 2024-09-13 PROCEDURE — 82570 ASSAY OF URINE CREATININE: CPT

## 2024-09-14 LAB
ALBUMIN SERPL BCP-MCNC: 4.8 G/DL (ref 3.2–4.9)
ALBUMIN/GLOB SERPL: 1.6 G/DL
ALP SERPL-CCNC: 48 U/L (ref 30–99)
ALT SERPL-CCNC: 23 U/L (ref 2–50)
ANION GAP SERPL CALC-SCNC: 15 MMOL/L (ref 7–16)
AST SERPL-CCNC: 21 U/L (ref 12–45)
BILIRUB SERPL-MCNC: 0.6 MG/DL (ref 0.1–1.5)
BUN SERPL-MCNC: 17 MG/DL (ref 8–22)
CALCIUM ALBUM COR SERPL-MCNC: 9 MG/DL (ref 8.5–10.5)
CALCIUM SERPL-MCNC: 9.6 MG/DL (ref 8.5–10.5)
CHLORIDE SERPL-SCNC: 105 MMOL/L (ref 96–112)
CHOLEST SERPL-MCNC: 134 MG/DL (ref 100–199)
CO2 SERPL-SCNC: 23 MMOL/L (ref 20–33)
CREAT SERPL-MCNC: 0.68 MG/DL (ref 0.5–1.4)
CREAT UR-MCNC: 23.67 MG/DL
FASTING STATUS PATIENT QL REPORTED: NORMAL
GFR SERPLBLD CREATININE-BSD FMLA CKD-EPI: 103 ML/MIN/1.73 M 2
GLOBULIN SER CALC-MCNC: 3 G/DL (ref 1.9–3.5)
GLUCOSE SERPL-MCNC: 93 MG/DL (ref 65–99)
HDLC SERPL-MCNC: 37 MG/DL
LDLC SERPL CALC-MCNC: 78 MG/DL
MICROALBUMIN UR-MCNC: <1.2 MG/DL
MICROALBUMIN/CREAT UR: NORMAL MG/G (ref 0–30)
POTASSIUM SERPL-SCNC: 4.5 MMOL/L (ref 3.6–5.5)
PROT SERPL-MCNC: 7.8 G/DL (ref 6–8.2)
SODIUM SERPL-SCNC: 143 MMOL/L (ref 135–145)
TRIGL SERPL-MCNC: 94 MG/DL (ref 0–149)

## 2025-01-30 DIAGNOSIS — E11.9 CONTROLLED TYPE 2 DIABETES MELLITUS WITHOUT COMPLICATION, WITHOUT LONG-TERM CURRENT USE OF INSULIN (HCC): ICD-10-CM

## 2025-01-30 DIAGNOSIS — E78.5 DYSLIPIDEMIA: ICD-10-CM

## 2025-02-02 RX ORDER — ATORVASTATIN CALCIUM 10 MG/1
10 TABLET, FILM COATED ORAL
Qty: 90 TABLET | Refills: 3 | Status: SHIPPED | OUTPATIENT
Start: 2025-02-02

## 2025-02-02 RX ORDER — LOSARTAN POTASSIUM 25 MG/1
25 TABLET ORAL
Qty: 90 TABLET | Refills: 3 | Status: SHIPPED | OUTPATIENT
Start: 2025-02-02

## 2025-04-18 ENCOUNTER — OFFICE VISIT (OUTPATIENT)
Dept: URGENT CARE | Facility: CLINIC | Age: 65
End: 2025-04-18
Payer: MEDICARE

## 2025-04-18 VITALS
RESPIRATION RATE: 18 BRPM | HEART RATE: 97 BPM | DIASTOLIC BLOOD PRESSURE: 72 MMHG | SYSTOLIC BLOOD PRESSURE: 124 MMHG | TEMPERATURE: 97 F | OXYGEN SATURATION: 95 % | HEIGHT: 73 IN | BODY MASS INDEX: 24.52 KG/M2 | WEIGHT: 185 LBS

## 2025-04-18 DIAGNOSIS — R04.0 EPISTAXIS: ICD-10-CM

## 2025-04-18 PROCEDURE — 99213 OFFICE O/P EST LOW 20 MIN: CPT | Performed by: PHYSICIAN ASSISTANT

## 2025-04-18 PROCEDURE — 3074F SYST BP LT 130 MM HG: CPT | Performed by: PHYSICIAN ASSISTANT

## 2025-04-18 PROCEDURE — 3078F DIAST BP <80 MM HG: CPT | Performed by: PHYSICIAN ASSISTANT

## 2025-04-18 RX ORDER — EUCALYPTUS/PEPPERMINT OIL
10 SOLUTION, NON-ORAL NASAL EVERY 6 HOURS PRN
Qty: 30 ML | Refills: 5 | Status: SHIPPED | OUTPATIENT
Start: 2025-04-18

## 2025-04-18 ASSESSMENT — ENCOUNTER SYMPTOMS
CHILLS: 0
FEVER: 0
BRUISES/BLEEDS EASILY: 0

## 2025-04-18 NOTE — PROGRESS NOTES
"Subjective:   Mac Yoder  is a 65 y.o. male who presents for Epistaxis (X3 days )      Epistaxis   This is a new problem. The current episode started in the past 7 days.   Patient is seen with wife present.  They describe increased nosebleeds recently.  Patient does have some mild symptoms of allergic rhinitis.  Has had increased nosebleeds daily.  Has no trouble stopping nosebleeds once started.  Does take aspirin but they have decreased her use of aspirin recently.  Patient does periodically take antihistamines but of also decreased her use of antihistamines recently.  Patient takes losartan but generally has well-controlled blood pressure.  Denies any known abnormalities of blood clotting.  They have no difficulty stopping nosebleeds.  Patient reports he tips his head back lays back and waits for bleeding to stop.  He has had nosebleeds with forceful nose blowing recently.  No active bleeding in urgent care today.  Patient has scheduled follow-up with primary care in 3 to 4 weeks.    Review of Systems   Constitutional:  Negative for chills and fever.   HENT:  Positive for nosebleeds.    Endo/Heme/Allergies:  Does not bruise/bleed easily.       No Known Allergies     Objective:   /72   Pulse 97   Temp 36.1 °C (97 °F) (Temporal)   Resp 18   Ht 1.854 m (6' 1\")   Wt 83.9 kg (185 lb)   SpO2 95%   BMI 24.41 kg/m²     Physical Exam  Vitals and nursing note reviewed.   Constitutional:       General: He is not in acute distress.     Appearance: Normal appearance. He is well-developed. He is not diaphoretic.   HENT:      Head: Normocephalic and atraumatic.      Right Ear: External ear normal.      Left Ear: External ear normal.      Nose: Nose normal.      Right Nostril: No epistaxis, septal hematoma or occlusion.      Left Nostril: No epistaxis, septal hematoma or occlusion.      Comments: Increased erythema, scabbing, no active bleeding, enlarged inflamed turbinates bilaterally  Eyes:      General: No " scleral icterus.        Right eye: No discharge.         Left eye: No discharge.      Conjunctiva/sclera: Conjunctivae normal.   Pulmonary:      Effort: Pulmonary effort is normal. No respiratory distress.   Musculoskeletal:         General: Normal range of motion.      Cervical back: Neck supple.   Skin:     General: Skin is warm and dry.      Coloration: Skin is not pale.   Neurological:      Mental Status: He is alert and oriented to person, place, and time.      Coordination: Coordination normal.         Assessment/Plan:   1. Epistaxis  - eucalyptus/peppermint oil (PONARIS NASAL) Solution; Administer 10 Drops into affected nostril(S) every 6 hours as needed (nasal dryness).  Dispense: 30 mL; Refill: 5    Patient normotensive in clinic.  No active nosebleed in clinic.  We discussed typical means of hemostasis for nosebleeds.  Sent with nasal emollient.  Encouraged humidification.  Follow-up with PCP next month for blood work.  Return to clinic with lack of resolution or progression of symptoms.      I have worn an N95 mask, gloves and eye protection for the entire encounter with this patient.     Differential diagnosis, natural history, supportive care, and indications for immediate follow-up discussed.

## 2025-05-15 ENCOUNTER — OFFICE VISIT (OUTPATIENT)
Dept: MEDICAL GROUP | Facility: MEDICAL CENTER | Age: 65
End: 2025-05-15
Payer: MEDICARE

## 2025-05-15 VITALS
BODY MASS INDEX: 26.09 KG/M2 | HEART RATE: 82 BPM | OXYGEN SATURATION: 93 % | TEMPERATURE: 97.3 F | SYSTOLIC BLOOD PRESSURE: 120 MMHG | WEIGHT: 196.9 LBS | DIASTOLIC BLOOD PRESSURE: 78 MMHG | HEIGHT: 73 IN

## 2025-05-15 DIAGNOSIS — Z12.11 COLON CANCER SCREENING: ICD-10-CM

## 2025-05-15 DIAGNOSIS — Z12.5 PROSTATE CANCER SCREENING: ICD-10-CM

## 2025-05-15 DIAGNOSIS — I10 ESSENTIAL HYPERTENSION: ICD-10-CM

## 2025-05-15 DIAGNOSIS — E11.9 CONTROLLED TYPE 2 DIABETES MELLITUS WITHOUT COMPLICATION, WITHOUT LONG-TERM CURRENT USE OF INSULIN (HCC): Primary | ICD-10-CM

## 2025-05-15 DIAGNOSIS — E78.5 DYSLIPIDEMIA: ICD-10-CM

## 2025-05-15 DIAGNOSIS — S06.9X5S TRAUMATIC BRAIN INJURY WITH PROLONGED (MORE THAN 24 HOURS) LOSS OF CONSCIOUSNESS WITH RETURN TO PRE-EXISTING CONSCIOUS LEVEL, SEQUELA (HCC): Chronic | ICD-10-CM

## 2025-05-15 LAB
HBA1C MFR BLD: 5.5 % (ref ?–5.8)
POCT INT CON NEG: NEGATIVE
POCT INT CON POS: POSITIVE

## 2025-05-15 PROCEDURE — 83036 HEMOGLOBIN GLYCOSYLATED A1C: CPT | Performed by: STUDENT IN AN ORGANIZED HEALTH CARE EDUCATION/TRAINING PROGRAM

## 2025-05-15 PROCEDURE — 99214 OFFICE O/P EST MOD 30 MIN: CPT | Performed by: STUDENT IN AN ORGANIZED HEALTH CARE EDUCATION/TRAINING PROGRAM

## 2025-05-15 PROCEDURE — 3074F SYST BP LT 130 MM HG: CPT | Performed by: STUDENT IN AN ORGANIZED HEALTH CARE EDUCATION/TRAINING PROGRAM

## 2025-05-15 PROCEDURE — 3078F DIAST BP <80 MM HG: CPT | Performed by: STUDENT IN AN ORGANIZED HEALTH CARE EDUCATION/TRAINING PROGRAM

## 2025-05-15 ASSESSMENT — PATIENT HEALTH QUESTIONNAIRE - PHQ9: CLINICAL INTERPRETATION OF PHQ2 SCORE: 0
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
